# Patient Record
Sex: FEMALE | Race: ASIAN | NOT HISPANIC OR LATINO | Employment: UNEMPLOYED | URBAN - METROPOLITAN AREA
[De-identification: names, ages, dates, MRNs, and addresses within clinical notes are randomized per-mention and may not be internally consistent; named-entity substitution may affect disease eponyms.]

---

## 2021-04-27 ENCOUNTER — OFFICE VISIT - HEALTHEAST (OUTPATIENT)
Dept: MIDWIFE SERVICES | Facility: CLINIC | Age: 31
End: 2021-04-27

## 2021-04-27 DIAGNOSIS — N89.8 VAGINAL IRRITATION: ICD-10-CM

## 2021-04-27 DIAGNOSIS — K59.09 CHRONIC CONSTIPATION: ICD-10-CM

## 2021-04-27 DIAGNOSIS — Z12.4 SCREENING FOR CERVICAL CANCER: ICD-10-CM

## 2021-04-27 DIAGNOSIS — R03.0 ELEVATED BLOOD PRESSURE READING IN OFFICE WITHOUT DIAGNOSIS OF HYPERTENSION: ICD-10-CM

## 2021-04-27 LAB
CLUE CELLS: NORMAL
TRICHOMONAS, WET PREP: NORMAL
YEAST, WET PREP: NORMAL

## 2021-04-27 RX ORDER — AMOXICILLIN 250 MG
1 CAPSULE ORAL 2 TIMES DAILY
Qty: 90 TABLET | Refills: 0 | Status: SHIPPED | OUTPATIENT
Start: 2021-04-27

## 2021-04-27 ASSESSMENT — MIFFLIN-ST. JEOR: SCORE: 1270.61

## 2021-04-28 LAB
HPV SOURCE: NORMAL
HUMAN PAPILLOMA VIRUS 16 DNA: NEGATIVE
HUMAN PAPILLOMA VIRUS 18 DNA: NEGATIVE
HUMAN PAPILLOMA VIRUS FINAL DIAGNOSIS: NORMAL
HUMAN PAPILLOMA VIRUS OTHER HR: NEGATIVE
SPECIMEN DESCRIPTION: NORMAL

## 2021-04-29 LAB
C TRACH DNA SPEC QL PROBE+SIG AMP: NEGATIVE
N GONORRHOEA DNA SPEC QL NAA+PROBE: NEGATIVE

## 2021-05-03 LAB
BKR LAB AP ABNORMAL BLEEDING: NO
BKR LAB AP BIRTH CONTROL/HORMONES: NORMAL
BKR LAB AP CERVICAL APPEARANCE: NORMAL
BKR LAB AP GYN ADEQUACY: NORMAL
BKR LAB AP GYN INTERPRETATION: NORMAL
BKR LAB AP GYN OTHER FINDINGS: NORMAL
BKR LAB AP HPV REFLEX: NORMAL
BKR LAB AP LMP: NORMAL
BKR LAB AP PATIENT STATUS: NORMAL
BKR LAB AP PREVIOUS ABNORMAL: NORMAL
BKR LAB AP PREVIOUS NORMAL: NORMAL
HIGH RISK?: NO
PATH REPORT.COMMENTS IMP SPEC: NORMAL
RESULT FLAG (HE HISTORICAL CONVERSION): NORMAL

## 2021-05-20 ENCOUNTER — RECORDS - HEALTHEAST (OUTPATIENT)
Dept: ADMINISTRATIVE | Facility: OTHER | Age: 31
End: 2021-05-20

## 2021-05-20 ENCOUNTER — OFFICE VISIT - HEALTHEAST (OUTPATIENT)
Dept: INTERNAL MEDICINE | Facility: CLINIC | Age: 31
End: 2021-05-20

## 2021-05-20 DIAGNOSIS — R14.2 FLATULENCE, ERUCTATION AND GAS PAIN: ICD-10-CM

## 2021-05-20 DIAGNOSIS — R14.3 FLATULENCE, ERUCTATION AND GAS PAIN: ICD-10-CM

## 2021-05-20 DIAGNOSIS — K59.09 CHRONIC CONSTIPATION: ICD-10-CM

## 2021-05-20 DIAGNOSIS — R39.9 URINARY SYMPTOM OR SIGN: ICD-10-CM

## 2021-05-20 DIAGNOSIS — R03.0 ELEVATED BLOOD PRESSURE READING WITHOUT DIAGNOSIS OF HYPERTENSION: ICD-10-CM

## 2021-05-20 DIAGNOSIS — R14.1 FLATULENCE, ERUCTATION AND GAS PAIN: ICD-10-CM

## 2021-05-20 LAB
ALBUMIN UR-MCNC: NEGATIVE G/DL
APPEARANCE UR: CLEAR
BILIRUB UR QL STRIP: NEGATIVE
COLOR UR AUTO: YELLOW
GLUCOSE UR STRIP-MCNC: NEGATIVE MG/DL
HGB UR QL STRIP: NEGATIVE
KETONES UR STRIP-MCNC: NEGATIVE MG/DL
LEUKOCYTE ESTERASE UR QL STRIP: NEGATIVE
NITRATE UR QL: NEGATIVE
PH UR STRIP: 6.5 [PH] (ref 5–8)
SP GR UR STRIP: <=1.005 (ref 1–1.03)
UROBILINOGEN UR STRIP-ACNC: NORMAL

## 2021-05-20 RX ORDER — SIMETHICONE 125 MG
125 TABLET,CHEWABLE ORAL EVERY 6 HOURS PRN
Qty: 30 TABLET | Refills: 1 | Status: SHIPPED | OUTPATIENT
Start: 2021-05-20 | End: 2021-10-31

## 2021-05-20 RX ORDER — POLYETHYLENE GLYCOL 3350 17 G/17G
17 POWDER, FOR SOLUTION ORAL 2 TIMES DAILY PRN
Qty: 100 PACKET | Refills: 1 | Status: SHIPPED | OUTPATIENT
Start: 2021-05-20 | End: 2021-09-22

## 2021-05-20 ASSESSMENT — MIFFLIN-ST. JEOR: SCORE: 1244.52

## 2021-05-25 ENCOUNTER — COMMUNICATION - HEALTHEAST (OUTPATIENT)
Dept: INTERNAL MEDICINE | Facility: CLINIC | Age: 31
End: 2021-05-25

## 2021-05-26 ENCOUNTER — TRANSCRIBE ORDERS (OUTPATIENT)
Dept: INTERNAL MEDICINE | Facility: CLINIC | Age: 31
End: 2021-05-26

## 2021-05-26 DIAGNOSIS — R14.3 FLATULENCE, ERUCTATION AND GAS PAIN: ICD-10-CM

## 2021-05-26 DIAGNOSIS — R14.2 FLATULENCE, ERUCTATION AND GAS PAIN: ICD-10-CM

## 2021-05-26 DIAGNOSIS — R14.1 FLATULENCE, ERUCTATION AND GAS PAIN: ICD-10-CM

## 2021-05-26 DIAGNOSIS — K59.09 CHRONIC CONSTIPATION: Primary | ICD-10-CM

## 2021-05-27 VITALS
SYSTOLIC BLOOD PRESSURE: 126 MMHG | OXYGEN SATURATION: 98 % | DIASTOLIC BLOOD PRESSURE: 102 MMHG | SYSTOLIC BLOOD PRESSURE: 130 MMHG | HEART RATE: 72 BPM | DIASTOLIC BLOOD PRESSURE: 88 MMHG

## 2021-05-27 VITALS — HEIGHT: 60 IN | WEIGHT: 138 LBS | BODY MASS INDEX: 27.41 KG/M2

## 2021-06-16 PROBLEM — Z12.4 SCREENING FOR CERVICAL CANCER: Status: ACTIVE | Noted: 2021-04-27

## 2021-06-16 PROBLEM — K59.09 CHRONIC CONSTIPATION: Status: ACTIVE | Noted: 2021-04-27

## 2021-06-17 NOTE — PROGRESS NOTES
Problem visit   A Tammy telephone  was used during the entire visit.     Assessment and Plan:     Chronic constipation  -     methylcellulose (CITRUCEL); Take 2 g by mouth daily.  -     senna-docusate (SENNOSIDES-DOCUSATE SODIUM) 8.6-50 mg tablet; Take 1 tablet by mouth 2 (two) times a day.  -     simethicone (MYLICON) 80 MG chewable tablet; Chew 1 tablet (80 mg total) every 6 (six) hours as needed for flatulence.  -     Ambulatory referral to Internal Medicine    Vaginal irritation  -     Wet Prep, genital  -     Chlamydia trachomatis & Neisseria gonorrhoeae, Amplified Detection    Screening for cervical cancer  -     PAP- Gynecologic Cytology (PAP Smear)    Elevated blood pressure reading in office without diagnosis of hypertension    She does have areas of hyperpigmentation on her lower abdomen but no rash noted. She stated that the burning is more inside of her abdomen and not on the outside. Exam is negative for cystocele, rectocele, uterine prolapse. No pain or tenderness with pelvic exam noted. She does have stool mass in the rectum felt during rectal exam. After discussion agreeable to trial of fiber and laxative with follow up in Primary Care.     Next follow up: 2 weeks in primary care     I discussed the following with the patient:   Adult Healthy Living: Importance of regular exercise  Healthy nutrition  Drinking 6-8 cups of water each day.     SALVADOR Olguin,FLO      Subjective:   Chief Complaint: Cindy Ford is an 30 y.o. female here for a problem visit. She is known to us but has not been her in over three years so is a new patient. She is here with her sister.   She has concerns today about chronic constipation and itching and burning on her on thighs.     started after last baby in 2019 so over a year now. She feels  bloated in the belly and feels like the uterus if falling out. Her hips are sore and burning including the anal area.    For constipation it feels like something is  stuck in there.   Constipation is very bad, lost of pressure to release only small amount of stool. Does not feel like she has hemorrhoids. States she is to uncomfortable to exercise because of her constipation.    She says the rash and itching started after the last pregnancy. She has not used any medications to help. Unsure what makes it worse.   Only using a medication for constipation- may be something to help with gas. Not for constipation.    No intercourse since last baby.   Also wanted me to know that she had her first baby  in [place] and she had some sort of stiches. She had something come from the belly into the vaginal.  Wonders if this is related?  And that she had a loss in 2010 for which she was in seen in the hospital in [place]. The ultrasound did not find any IUP. When she went home she miscarried. She was @12 weeks along.     Unable to obtain records but states she had her last baby in [place] on 5/5/2109. No diabetes, no HTN, no PPH.     Dietary recall- eats two meals per day.   Breakfast- rice and soup. No coffee or tea  Dinner- rice and soup- veggies in soup include chop soy and dried fish  Liquids in the day include- water   When she drank milk she had very upset stomach - only since last birth       Healthy Habits  Are you taking a daily aspirin? No  Do you typically exercising at least 40 min, 3-4 times per week?  NO  Do you usually eat at least 4 servings of fruit and vegetables a day, include whole grains and fiber and avoid regularly eating high fat foods? NO  Have you had an eye exam in the past two years? NO  Do you see a dentist twice per year? NO  Do you have any concerns regarding sleep? No    Safety Screen  If you own firearms, are they secured in a locked gun cabinet or with trigger locks? The patient does not own any firearms  No data recorded    Review of Systems:  Please see above.  The rest of the review of systems are negative for all systems.     History     Reviewed By Date/Time  Sections Reviewed    Adin Rivera, [facility] 4/27/2021  2:01 PM Tobacco        Objective:   Vital Signs:   Visit Vitals  BP (!) 132/94 (Patient Site: Right Arm, Patient Position: Sitting, Cuff Size: Adult Regular)   Pulse 84   Ht 5' (1.524 m)   Wt 142 lb (64.4 kg)   LMP 04/07/2021 (Exact Date)   Breastfeeding No   BMI 27.73 kg/m       PHYSICAL EXAM  General appearance: alert, appears stated age and cooperative  Abdomen: normal findings: soft, non-tender  Pelvic: cervix normal in appearance, external genitalia normal, no adnexal masses or tenderness, no cervical motion tenderness, rectovaginal septum normal, uterus normal size, shape, and consistency and vagina normal without discharge  Skin: hyperpigmentation - abdomen  Neurologic: Grossly normal

## 2021-06-18 NOTE — PATIENT INSTRUCTIONS - HE
Patient Instructions by Dorita Cardenas APRN, CNM at 4/27/2021  1:40 PM     Author: Dorita Cardenas APRN, CNM Service: -- Author Type: Midwife    Filed: 4/27/2021  2:27 PM Encounter Date: 4/27/2021 Status: Signed    : Dorita Cardenas APRN, CNM (Midwife)       Patient Education     Discharge Instructions: Eating a High-Fiber Diet    Your healthcare provider has prescribed a high-fiber diet for you. Fiber is what gives strength and structure to plants. Most grains, beans, vegetables, and fruits contain fiber. Foods rich in fiber are often low in calories and fat, but they fill you up more. These foods may also reduce the risk of certain health problems.  There are two types of fiber:    Insoluble fiber. This is found in whole-grains, cereals, and certain fruits and vegetables (such as apple skins, corn, and beans). Insoluble fiber is made up mainly of plant cell walls. It may prevent constipation and reduce the risk of certain types of cancer.    Soluble fiber. This type of fiber is found in oats, beans, nuts, and certain fruits and vegetables (such as strawberries and peas). Soluble fiber turns to gel in the digestive system, slowing the movement of the digestive tract. It helps control blood sugar levels and can reduce cholesterol, which may help lower the risk of heart disease. Soluble fiber can also help control appetite.   Home care    Know how much fiber you need a day. The recommended daily amount of fiber is 25 grams for women and 38 grams for men. After age 50, daily fiber needs drop to 21 grams for women and 30 grams for men.    Ask your healthcare provider about a fiber supplement. (Always take fiber supplements with a large glass of water.)    Keep track of how much fiber you eat.    Eat a variety of foods high in fiber.    Learn to read and understand food labels.    Ask your healthcare provider how much water you should be drinking.    Look for these high-fiber  foods:  ? Whole-grain breads and cereals    6 ounces a day give you about 18 grams of fiber (1 ounce is equal to 1 slice of bread, 1 cup of dry cereal, or 1/2 cup of cooked rice).    Include wheat and oat bran cereals, whole-wheat muffins or toast, and corn tortillas in your meals.  ? Fruits     2 cups a day give you about 8 grams of fiber.    Apples, oranges, strawberries, pears, and bananas are good sources.    Fruit juice does not contain as much fiber as the fruit it was made from.  ? Vegetables    2  cups a day give you about 11 grams of fiber. Add asparagus, carrots, broccoli, peas, and corn to your meals.  ? Legumes    1/4 cup a day (in place of meat) gives you about 4 grams of fiber. Try navy beans, lentils, chickpeas, and soybeans.  ? Seeds     A small handful of seeds gives you about 3 grams of fiber. Try sunflower seeds.  Follow-up  Make a follow-up appointment, or as advised. Ask your healthcare provider if seeing a registered dietitian may help you plan a high fiber diet.  Date Last Reviewed: 6/1/2017 2000-2019 [employer]. [address] [place] [address], [place] [address]. All rights reserved. This information is not intended as a substitute for professional medical care. Always follow your healthcare professional's instructions.

## 2021-06-18 NOTE — PATIENT INSTRUCTIONS - HE
Patient Instructions by Sara Rome CNP at 5/20/2021  3:20 PM     Author: Sara Rome CNP Service: -- Author Type: Nurse Practitioner    Filed: 5/20/2021  3:50 PM Encounter Date: 5/20/2021 Status: Addendum    : Sara Rome CNP (Nurse Practitioner)    Related Notes: Original Note by Sara Rome CNP (Nurse Practitioner) filed at 5/20/2021  3:42 PM       Stop senna and start Miralax 1-2 times daily.        Patient Education     Excess Gas  Certain foods produce gas when digested. In some people, these foods make an excessive amount of gas. This may cause bloating, burping, or increased gas passing through the rectum (flatulence).     Foods that cause gas  The following foods are more likely to cause this problem. Limit them, or remove them from your diet:    Broccoli    Cauliflower    Spiro sprouts    Cabbage    Cooked dried beans    Fizzy (carbonated) drinks, such as sparkling water, soda, beer, and champagne  Other causes  Other causes of excess gas include:    Eating too fast or talking while you chew. This may cause you to swallow air. This increases the amount of gas in your stomach. And it may make your symptoms worse. Chew each mouthful completely before you swallow. Take your time.    Chewing on gum or sucking on hard candy. These cause you to swallow more often. And some of what you are swallowing is air. This leads to more gas in your stomach. Avoid chewing gum and hard candy.    Overeating. This may increase the feeling of being bloated and cause more gas. When you are full, stop eating.     Being constipated. This can increase the amount of normal intestinal gas. Avoid constipation by getting more fiber in your diet. Good sources of fiber include whole-grain cereal, fresh vegetables (except those in the above list), and fresh fruits. High-fiber foods absorb water and carry it out of the body. When adding more fiber to your diet, you also need to drink  more water. You should drink at least 8, 8-ounce glasses of water (2 quarts) per day.  Date Last Reviewed: 8/1/2016 2000-2017 [employer]. [address] [place] [address], [place] [address]. All rights reserved. This information is not intended as a substitute for professional medical care. Always follow your healthcare professional's instructions.

## 2021-06-21 NOTE — LETTER
Letter by Sara Rome CNP at      Author: Sara Rome CNP Service: -- Author Type: --    Filed:  Encounter Date: 5/25/2021 Status: (Other)         Cindy Ford  [address] [place] [address]             May 25, 2021         Dear Ms. Ford,    Below are the results from your recent visit:    Resulted Orders   XR Abdomen 2 Views    Narrative    EXAM DATE:         05/20/2021    EXAM: X-RAY ABDOMEN, 2 VIEWS  LOCATION: [facility]  DATE/TIME: 5/20/2021 4:15 PM    INDICATION: constipation  COMPARISON: None.    IMPRESSION: Negative abdomen. Bowel gas pattern is normal. Nothing for obstruction or free air. Air-fluid levels are seen within the small bowel and right hemicolon but no intestinal overdistention. No evidence for renal stones.                 Please see results     Please call with questions or contact us using [employer].    Sincerely,        Electronically signed by Sara Rome CNP

## 2021-06-21 NOTE — LETTER
Letter by Sara Rome CNP at      Author: Sara Rome CNP Service: -- Author Type: --    Filed:  Encounter Date: 5/25/2021 Status: (Other)         Cindy Ford  [address] [place] [address]             May 25, 2021         Dear Ms. Logan,    Below are the results from your recent visit:    Resulted Orders   Urinalysis-UC if Indicated   Result Value Ref Range    Color, UA Yellow Colorless, Yellow, Straw, Light Yellow    Clarity, UA Clear Clear    Glucose, UA Negative Negative    Protein, UA Negative Negative    Bilirubin, UA Negative Negative    Urobilinogen, UA 0.2 E.U./dL 0.2 E.U./dL, 1.0 E.U./dL    pH, UA 6.5 5.0 - 8.0    Blood, UA Negative Negative    Ketones, UA Negative Negative    Nitrite, UA Negative Negative    Leukocytes, UA Negative Negative    Specific Gravity, UA <=1.005 1.005 - 1.030    Narrative    Microscopic not indicated  UC not indicated       Please see results     Please call with questions or contact us using [employer].    Sincerely,        Electronically signed by Sara Rome CNP

## 2021-06-24 ENCOUNTER — RECORDS - HEALTHEAST (OUTPATIENT)
Dept: ADMINISTRATIVE | Facility: OTHER | Age: 31
End: 2021-06-24

## 2021-06-24 LAB
ALT SERPL W/O P-5'-P-CCNC: 27 U/L (ref 0–78)
AST SERPL-CCNC: 19 U/L (ref 0–37)
CREAT SERPL-MCNC: 0.64 MG/DL (ref 0.6–1.02)

## 2021-06-30 NOTE — PROGRESS NOTES
Progress Notes by Sara Rome CNP at 2021  3:20 PM     Author: Sara Rome CNP Service: -- Author Type: Nurse Practitioner    Filed: 2021  4:13 PM Encounter Date: 2021 Status: Signed    : Sara Rome CNP (Nurse Practitioner)            [facility]  Patient Name: Cindy Ford  Patient Age: 30 y.o.  YOB: 1990  MRN: [id number]    Date of Visit: 2021  Reason for Office Visit:   Chief Complaint   Patient presents with   ? Follow-up     having pain in rectum after delivery X 2 years ago, having pain in vaginal area. having abdominal pain/pressure           Assessment / Plan / Medical Decision Makin. Chronic constipation  Recommend discontinuation of senna start MiraLAX twice daily high-fiber diet, adequate water and exercise  - polyethylene glycol (MIRALAX) 17 gram packet; Take 1 packet (17 g total) by mouth 2 (two) times a day as needed.  Dispense: 100 packet; Refill: 1  - XR Abdomen 2 Views; Future  - XR Abdomen 2 Views    2. Flatulence, eructation and gas pain  Recommend avoiding gassy food and beverages these are reviewed with patient.  Recommend continuing simethicone every 6 hours as needed  - simethicone (MYLICON) 125 MG chewable tablet; Chew 1 tablet (125 mg total) every 6 (six) hours as needed for flatulence.  Dispense: 30 tablet; Refill: 1    3. Urinary symptom or sign  We will obtain a UA and treat accordingly  - Urinalysis-UC if Indicated    4. Elevated blood pressure reading without diagnosis of hypertension    BP Readings from Last 3 Encounters:   21 126/88   21 (!) 132/94       Patient's blood pressure noted to be elevated in clinic, recommend following a low-sodium diet will have patient follow-up in 1 week repeat blood pressure at that time if remains elevated recommend starting patient on Norvasc 2.5 mg daily.      Orders Placed This Encounter   Procedures   ? XR Abdomen 2 Views   ? Urinalysis-UC  if Indicated   Followup: Return in about 1 week (around 5/27/2021). earlier if needed.            Health Maintenance Review  Health Maintenance   Topic Date Due   ? HEPATITIS C SCREENING  Never done   ? PREVENTIVE CARE VISIT  Never done   ? COVID-19 Vaccine (1) Never done   ? HIV SCREENING  Never done   ? TD 18+ HE  Never done   ? TDAP ADULT ONE TIME DOSE  Never done   ? ADVANCE CARE PLANNING  Never done   ? INFLUENZA VACCINE RULE BASED (Season Ended) 08/01/2021   ? PAP SMEAR  04/27/2026   ? HPV TEST  04/27/2026   ? Pneumococcal Vaccine: Pediatrics (0 to 5 Years) and At-Risk Patients (6 to 64 Years)  Aged Out   ? HEPATITIS B VACCINES  Aged Out         I have discontinued Cindy Ford's simethicone. I have also changed her simethicone. Additionally, I am having her start on polyethylene glycol. Lastly, I am having her maintain her methylcellulose and senna-docusate.      HPI:  Cindy Ford is a 30 y.o. year old who presents to the office today with chronic conditions including chronic constipation.  Patient is new to my practice presents to clinic today with utilization of the [facility] .    Patient was seen by midwives in April 2021 was started on Citrucel 2 g daily and senna twice daily in addition to simethicone as needed for flatulence.  She reported that the constipation was very bad water pressure to release only small amount of stool she noted no hemorrhoids and stated that she was uncomfortable with exercise because of her constipation.    She states she is not constipation, she states when she is lying down, she feels like she lies down she is able to pass gas and feels like she needs to have a bowel movement. She states she is having bowel movements. She states it is soft stools.  She states every time she has a bowel movement it is a little at a time and will go every 1-2 days.        Review of Systems- unremarkable other than listed above and below      Current Scheduled Meds:  Outpatient Encounter  "Medications as of 5/20/2021   Medication Sig Dispense Refill   ? senna-docusate (SENNOSIDES-DOCUSATE SODIUM) 8.6-50 mg tablet Take 1 tablet by mouth 2 (two) times a day. 90 tablet 0   ? methylcellulose (CITRUCEL) Take 2 g by mouth daily. 454 g 0   ? polyethylene glycol (MIRALAX) 17 gram packet Take 1 packet (17 g total) by mouth 2 (two) times a day as needed. 100 packet 1   ? simethicone (MYLICON) 125 MG chewable tablet Chew 1 tablet (125 mg total) every 6 (six) hours as needed for flatulence. 30 tablet 1   ? [DISCONTINUED] simethicone (MYLICON) 125 MG chewable tablet Chew 125 mg every 6 (six) hours as needed for flatulence.     ? [DISCONTINUED] simethicone (MYLICON) 80 MG chewable tablet Chew 1 tablet (80 mg total) every 6 (six) hours as needed for flatulence. 90 tablet 0     No facility-administered encounter medications on file as of 5/20/2021.      No past medical history on file.  No past surgical history on file.  Social History     Tobacco Use   ? Smoking status: Never Smoker   ? Smokeless tobacco: Current User   ? Tobacco comment: Betel nut   Substance Use Topics   ? Alcohol use: No   ? Drug use: No     Family History   Problem Relation Age of Onset   ? Hypertension Mother    ? Stroke Father      Social History     Social History Narrative   ? Not on file       Objective / Physical Examination:  Vitals:    05/20/21 1531 05/20/21 1600   BP: (!) 130/102 126/88   Pulse: 72    SpO2: 98%    Weight: 138 lb (62.6 kg)    Height: 4' 11.5\" (1.511 m)      Wt Readings from Last 3 Encounters:   05/20/21 138 lb (62.6 kg)   04/27/21 142 lb (64.4 kg)     Body mass index is 27.41 kg/m .     General Appearance: Alert and oriented, cooperative, affect appropriate, speech clear, in no apparent distress  Head: Normocephalic, atraumatic  Eyes:Conjunctivae clear and sclerae non-icteric  Neck: Supple, trachea midline. No cervical adenopathy  Lungs:  Normal inspiratory and expiratory effort  Abdomen: Bowel sounds active all four " quadrants. Soft, mild right lower quadrant tenderness close to the pelvis. No hepatomegaly or splenomegaly.   Extremities: Pulses 2+ and equal throughout. No edema.   Integumentary: Warm and dry.   Neuro: Alert and oriented, follows commands appropriately.     No results found.  No results found for this or any previous visit (from the past 240 hour(s)).  Diagnostics:     Results for orders placed or performed in visit on 04/27/21   Wet Prep, genital    Specimen: Genital   Result Value Ref Range    Yeast Result No yeast seen No yeast seen    Trichomonas No Trichomonas seen No Trichomonas seen    Clue Cells, Wet Prep No Clue cells seen No Clue cells seen   Chlamydia trachomatis & Neisseria gonorrhoeae, Amplified Detection    Specimen: Body Fluid   Result Value Ref Range    Chlamydia trachomatis, Amplified Detection Negative Negative    Neisseria gonorrhoeae, Amplified Detection Negative Negative   PAP- Gynecologic Cytology (PAP Smear)   Result Value Ref Range    Case Report       Gynecologic Cytology Report                       Case: [id number]                                   Authorizing Provider:  Dorita Cardenas,    Collected:           04/27/2021 1600                                     FLO FRANCO                                                                     Ordering Location:     [facility]          Received:            04/27/2021 1600                                     [facility]                                                          First Screen:          Milana Kate, CT                                                                               (ASCP)                                                                       Specimen:    SUREPATH PAP, SCREENING, Endocervical/cervical                                             Interpretation  Negative for squamous intraepithelial lesion or malignancy.      Negative for squamous intraepithelial lesion or malignancy    Result  Flag Normal Normal    Other Findings       Shift in vaginal elisha suggestive of bacterial vaginosis    Specimen Adequacy       Satisfactory for evaluation, endocervical/transformation zone component present    HPV Reflex? Yes regardless of result     HIGH RISK No     LMP/Menopause Date 04/07/21     Abnormal Bleeding No     Pt Status not pregnant.     Birth Control/Hormones None     Previous Normal/Date 9/2018     Prev Abn Date/Dx none     Cervical Appearance normal    HPV High Risk DNA Cervical   Result Value Ref Range    HPV Source SurePath     HPV16 DNA Negative NEG    HPV18 DNA Negative NEG    Other HR HPV Negative NEG    Final Diagnosis SEE NOTES     Specimen Description Cervical Cells        Quality review:     PHQ-2 Total Score: 0 (5/20/2021  3:32 PM)  Depression Follow-up Plan: patient follow-up to return when and if necessary (5/20/2021  3:32 PM)      No data recorded      Sara Rome CNP  Internal Medicine  [address] [address]

## 2021-07-01 ENCOUNTER — RECORDS - HEALTHEAST (OUTPATIENT)
Dept: HEALTH INFORMATION MANAGEMENT | Facility: CLINIC | Age: 31
End: 2021-07-01

## 2021-09-22 ENCOUNTER — OFFICE VISIT (OUTPATIENT)
Dept: FAMILY MEDICINE | Facility: CLINIC | Age: 31
End: 2021-09-22
Payer: COMMERCIAL

## 2021-09-22 VITALS
HEART RATE: 76 BPM | DIASTOLIC BLOOD PRESSURE: 80 MMHG | SYSTOLIC BLOOD PRESSURE: 110 MMHG | RESPIRATION RATE: 12 BRPM | HEIGHT: 59 IN | OXYGEN SATURATION: 99 % | WEIGHT: 135.2 LBS | TEMPERATURE: 98 F | BODY MASS INDEX: 27.26 KG/M2

## 2021-09-22 DIAGNOSIS — R10.31 RIGHT GROIN PAIN: Primary | ICD-10-CM

## 2021-09-22 DIAGNOSIS — G89.29 CHRONIC RIGHT-SIDED LOW BACK PAIN WITH RIGHT-SIDED SCIATICA: ICD-10-CM

## 2021-09-22 DIAGNOSIS — K59.09 CHRONIC CONSTIPATION: ICD-10-CM

## 2021-09-22 DIAGNOSIS — M54.41 CHRONIC RIGHT-SIDED LOW BACK PAIN WITH RIGHT-SIDED SCIATICA: ICD-10-CM

## 2021-09-22 PROCEDURE — 99214 OFFICE O/P EST MOD 30 MIN: CPT | Performed by: FAMILY MEDICINE

## 2021-09-22 RX ORDER — POLYETHYLENE GLYCOL 3350 17 G/17G
17 POWDER, FOR SOLUTION ORAL DAILY
Qty: 510 G | Refills: 1 | Status: SHIPPED | OUTPATIENT
Start: 2021-09-22 | End: 2021-10-31

## 2021-09-22 RX ORDER — NAPROXEN 500 MG/1
500 TABLET ORAL 2 TIMES DAILY WITH MEALS
Qty: 60 TABLET | Refills: 4 | Status: SHIPPED | OUTPATIENT
Start: 2021-09-22 | End: 2021-10-31

## 2021-09-22 ASSESSMENT — MIFFLIN-ST. JEOR: SCORE: 1236.01

## 2021-09-22 NOTE — PROGRESS NOTES
OUTPATIENT VISIT NOTE                                                   Date of Visit: 9/22/2021     Chief Complaint   Patient presents with:  BM problems            History of Present Illness   Cindy Ford is a 30 year old female with sister and phone  in for follow up from chiropractor. They state he told her she has a problem with her sacrum  She c/o Right sided Low back pain started about two years ago after she delivered her child.  Pain radiates into her right buttocks.  Has numbness that goes down the front side of right thigh down to the knee.  No weakness of leg.  Pain is intermittent.  She thinks the pain is worse with gassiness.  She also has pain in her right groin area.  She thinks she is missing a tendon because that side feels different than on the left side.    She states she is having trouble with her bowel movements--Doesn't have the urge to have bowel movement-every 2-3 days--just small pieces of stool  This has been going on for about 5 months. Uses a tablet for gas.  Using a powder twice daily.  But she has been out of the powder for at least a month.      She just recently started seeing the chiropractor.  She saw KRYSTA VILLANUEVA in June and they felt she was having constipation and possibly some pelvic floor dysfunction.                 MEDICATIONS   Current Outpatient Medications   Medication     methylcellulose (CITRUCEL) powder     naproxen (NAPROSYN) 500 MG tablet     polyethylene glycol (MIRALAX) 17 GM/Dose powder     senna-docusate (SENNOSIDES-DOCUSATE SODIUM) 8.6-50 mg tablet     simethicone (MYLICON) 125 MG chewable tablet     No current facility-administered medications for this visit.         SOCIAL HISTORY   Social History     Tobacco Use     Smoking status: Never Smoker     Smokeless tobacco: Current User     Tobacco comment: Betel nut   Substance Use Topics     Alcohol use: No           Physical Exam   Vitals:    09/22/21 0949   BP: 110/80   Pulse: 76   Resp: 12   Temp: 98  F (36.7  " C)   TempSrc: Oral   SpO2: 99%   Weight: 61.3 kg (135 lb 3.2 oz)   Height: 1.51 m (4' 11.45\")        GENERAL:   Alert. Oriented.  EYES: Clear  HENT:  Ears: R TM pearly gray. Normal landmarks. L TM pearly gray.  Normal landmarks  Nose: Clear.  Sinuses: Nontender.  Oropharynx:  No erythema. No exudate.  NECK: Supple. No adenopathy.  LUNGS: Clear to ascultation.  No crackles.  No wheezing  HEART: RRR  SKIN:  No rash.   ABDOMEN:  +BS. No tenderness. Ropy feeling LLQ. Soft, no guarding, rebound, rigidity,mass, or organomegaly. No CVA tenderness    BACK:  No pain to percussion over LS spine.  No tenderness to palpation over muscles.  NEURO:  DTR's: Patellar  L 2/4  R 2/4                                Achilles  L  2/4  R 2/4                  Motor:  Great Toe Flexion L 5/5  R 5/5                                                  Extension L 5/5  R 5/5                              Ankle Flexion L 5/5  R 5/5                                        Extension L 5/5  R 5/5                              Knee Flexion  L 5/5  R 5/5                                        Extension  L 5/5  R 5/5                              Hip Abduction  L 5/5  R 5/5                                    Adduction   L 5/5  R 5/5                              Hip Flexion L 5/5  R 5/5                                     Extension L 5/5  R 5/5                  Sensation intact to light touch throughout both LE                   Straight leg raising negative BL      Diagnostic Studies   XRAY:  X-RAY right hip:    Negative. No fracture or dislocation.  Personally reviewed.         Assessment and Plan     Right groin pain  Hip xray appears essentially normal  - XR Hip Right 2-3 Views    Chronic right-sided low back pain with right-sided sciatica    - Spine Referral  - naproxen (NAPROSYN) 500 MG tablet  Dispense: 60 tablet; Refill: 4  Chronic low back for the last couple of years.  appearently has just been in to start having this be evaluated recently.  She " does note some radiation into buttocks and numbness in anterior thigh.  She seems to feel the pain in the groin is also associated with the back pain.  No objective findings on neuro exam of lower extremities.    Will have her start icing frequently.  Naproxen twice dailly for 14 days then as needed.  Referred to spine clinic.       Chronic constipation  I think she is feeling stool in her lower colon.  I'm not sure how the three problems that she is having are related--ie how much of the different symptoms she is having is due to which problem as she tends to relate them all together.  Discussed that she needs to take the fiber and miralax daily for a while to see if it will help with her constipation.    - methylcellulose (CITRUCEL) powder  Dispense: 454 g; Refill: 0  - polyethylene glycol (MIRALAX) 17 GM/Dose powder  Dispense: 510 g; Refill: 1     She has a follow up visit with [facility] next month.  She wants to come to our clinic for her primary care. Discussed that it will be helpful for her to have a primary doctor that can help interpret her test results and exams and coordinate the various sites of care that she is going to.  Discussed that I would like her to follow up with spine clinic rather than the chiropractor.            Recheck in one month.      Discussed signs / symptoms that warrant urgent / emergent medical attention.     Recheck if worsening or not improving.       Saida Yi MD          Pertinent History     The following portions of the patient's history were reviewed and updated as appropriate: allergies, current medications, past family history, past medical history, past social history, past surgical history and problem list.

## 2021-10-06 ENCOUNTER — OFFICE VISIT (OUTPATIENT)
Dept: PHYSICAL MEDICINE AND REHAB | Facility: CLINIC | Age: 31
End: 2021-10-06
Payer: COMMERCIAL

## 2021-10-06 VITALS — HEART RATE: 77 BPM | OXYGEN SATURATION: 100 % | DIASTOLIC BLOOD PRESSURE: 88 MMHG | SYSTOLIC BLOOD PRESSURE: 142 MMHG

## 2021-10-06 DIAGNOSIS — M54.16 RIGHT LUMBAR RADICULITIS: ICD-10-CM

## 2021-10-06 DIAGNOSIS — M54.41 CHRONIC BILATERAL LOW BACK PAIN WITH RIGHT-SIDED SCIATICA: Primary | ICD-10-CM

## 2021-10-06 DIAGNOSIS — G89.29 CHRONIC BILATERAL LOW BACK PAIN WITH RIGHT-SIDED SCIATICA: Primary | ICD-10-CM

## 2021-10-06 PROCEDURE — 99215 OFFICE O/P EST HI 40 MIN: CPT | Performed by: NURSE PRACTITIONER

## 2021-10-06 ASSESSMENT — PAIN SCALES - GENERAL: PAINLEVEL: EXTREME PAIN (9)

## 2021-10-06 NOTE — PROGRESS NOTES
ASSESSMENT: Cindy Ford is a 30 year old female who presents for consultation with a past medical history significant for chronic constipation, who presents today for new patient evaluation of:    -Chronic bilateral low back pain right greater than left with right lumbar radiculitis L4 dermatomal pattern with paresthesias.  Negative hip and negative SI provocative maneuvers on exam.    Patient is neurologically intact on exam. No myelopathic or red flag symptoms.     OSWESTRY DISABILITY INDEX 10/6/2021   Count 10   Sum 23   Oswestry Score (%) 46            Diagnoses and all orders for this visit:  Chronic bilateral low back pain with right-sided sciatica  -     MR Lumbar Spine w/o Contrast; Future  -     Physical Therapy Referral; Future  Right lumbar radiculitis  -     MR Lumbar Spine w/o Contrast; Future  -     Physical Therapy Referral; Future    PLAN:  Reviewed spine anatomy and disease process. Discussed diagnosis and treatment options with the patient today. A shared decision making model was used.  The patient's values and choices were respected. The following represents what was discussed and decided upon by the provider and the patient.      -DIAGNOSTIC TESTS:  Images were personally reviewed and interpreted and explained to patient today using spine model.   --Ordered lumbar spine MRI to further evaluate lumbar radiculitis, no benefit with prior conservative treatment.  --Right hip x-ray 9/22/2021 with normal joint space and alignment.  Right SI joint unremarkable.    -PHYSICAL THERAPY: Referral to physical therapy specifically for core strengthening pelvic floor exercises and nerve glide exercises.  Discussed the importance of core strengthening, ROM, stretching exercises with the patient and how each of these entities is important in decreasing pain.  Explained to the patient that the purpose of physical therapy is to teach the patient a home exercise program.  These exercises need to be performed every day  in order to decrease pain and prevent future occurrences of pain.        -MEDICATIONS: Consider medications such as gabapentin down the road.  Discussed multiple medication options today with patient. Discussed risks, side effects, and proper use of medications. Patient verbalized understanding.    -INTERVENTIONS: Consider injections pending MRI imaging.  Discussed risks and benefits of injections with patient today.    -PATIENT EDUCATION:  Total time of 46 minutes spent with the patient, reviewing the chart, placing orders, and documenting.   -Today we also discussed the issues related to the current COVID-19 pandemic, the pros and cons of the current treatment plan, the CDC guidelines such as social distancing, washing hands, masking, and covering the cough.    -FOLLOW-UP:   Follow-up after obtaining MRI to review results and ongoing plan.    Advised patient to call the Spine Center if symptoms worsen or you have problems controlling bladder and bowel function.   ______________________________________________________________________    SUBJECTIVE:  HPI:  Cindy Ford  Is a 30 year old female who presents today for new patient evaluation of low back pain bilateral low back pain lower lumbar spine in the belt line with radiation of pain on the right buttock lateral hip anterior groin and anterior thigh that stops at the knee with associated numbness and tingling also ongoing for the last 2 years, no relief with chiropractic treatments and exercises given to her by the chiropractor.  Her pain is fairly significant at a constant 9/10.  Aggravated with prolonged sitting as well as prolonged laying down.  Tolerable with walking however the pain is constant.  Patient denies lower extremity weakness, denies recent trips or falls or balance changes, denies bowel or bladder loss control.    Patient's sister who does speak fluent English was present today during entire visit and added to past medical/surgical/family/social  history and history of presenting illness.   Tammy  was present during entire visit today.     -Treatment to Date: No prior spinal surgery or spinal injection  Chiropractic treatments September 2021 with no benefit    -Medications:  Naproxen 500 mg with minimal benefit    Current Outpatient Medications   Medication     naproxen (NAPROSYN) 500 MG tablet     methylcellulose (CITRUCEL) powder     polyethylene glycol (MIRALAX) 17 GM/Dose powder     senna-docusate (SENNOSIDES-DOCUSATE SODIUM) 8.6-50 mg tablet     simethicone (MYLICON) 125 MG chewable tablet     No current facility-administered medications for this visit.       No Known Allergies    No past medical history on file.     Patient Active Problem List   Diagnosis     Implantable Contraceptive Capsules - Insertion     Chronic constipation     Screening for cervical cancer       No past surgical history on file.    Family History   Problem Relation Age of Onset     Hypertension Mother      Cerebrovascular Disease Father        Reviewed past medical, surgical, and family history with patient found on new patient intake packet located in EMR [employer] tab.     SOCIAL HX: Patient is .  Patient does report chewing tobacco.  Patient denies smoking/tobacco use, denies alcohol use, denies history being heavy drinker.    ROS: Positive for joint pain, muscle pain, dizziness, headache.  Specifically negative for bowel/bladder dysfunction, balance changes, foot drop, fevers, chills, appetite changes, nausea/vomiting, unexplained weight loss. Otherwise 13 systems reviewed are negative. Please see the patient's intake questionnaire from today for details.    OBJECTIVE:  BP (!) 142/88 (BP Location: Left arm, Patient Position: Sitting)   Pulse 77   SpO2 100%     PHYSICAL EXAMINATION:    --CONSTITUTIONAL:  Vital signs as above.  No acute distress.  The patient is well nourished and well groomed.  --PSYCHIATRIC:  Appropriate mood and affect. The patient is awake,  alert, oriented to person, place, time and answering questions appropriately with clear speech.    --SKIN:  Skin over the face, bilateral lower extremities, and posterior torso is clean, dry, intact without rashes.    --RESPIRATORY: Normal rhythm and effort. No abnormal accessory muscle breathing patterns noted.   --ABDOMINAL:  Non-distended.  --STANDING EXAMINATION:  Normal lumbar lordosis noted, no lateral shift.  --MUSCULOSKELETAL: Lumbar spine inspection reveals no evidence of deformity. Range of motion is not limited in lumbar flexion, extension, lateral rotation. No tenderness to palpation lumbar spine. Straight leg raising in the supine position is negative to radicular pain, increased back pain on the right. Sciatic notch non-tender.  --SACROILIAC JOINT: Negative distraction.  Negative Oksana's with reproduction of pain to affected extremity.  Negative Gaenslen's Test with reproduction of pain to affected extremity. One Finger point test negative.  --GROSS MOTOR: Gait is non-antalgic. Easily arises from a seated position.   --LOWER EXTREMITY MOTOR TESTING:  Plantar flexion left 5/5, right 5/5   Dorsiflexion left 5/5, right 5/5   Great toe MTP extension left 5/5, right 5/5  Knee flexion left 5/5, right 5/5  Knee extension left 5/5, right 5/5   Hip flexion left 5/5, right 5/5  Hip abduction left 5/5, right 5/5  Hip adduction left 5/5, right 5/5   --HIPS: Full range of motion bilaterally. Negative FABERs on the involved lower extremity.  Negative scour maneuver.  --NEUROLOGICAL:  2/4 patellar, medial hamstring, and achilles reflexes bilaterally.  Sensation to light touch is intact in the bilateral L4, L5, and S1 dermatomes. Babinski is negative. No clonus.  Negative Navin reflex bilaterally.  --VASCULAR:  2/4 dorsalis pedis and posterior tibialsi pulses bilaterally.  Bilateral lower extremities are warm.  There is no pitting edema of the bilateral lower extremities.    RESULTS: Prior medical records from [facility] and Care Everywhere were reviewed today.    Imaging: Lumbar spine Imaging was personally reviewed and interpreted today. The images were shown to the patient and the findings were explained using a spine model.      XR Hip Right 2-3 Views  Result Date: 9/22/2021  EXAM: XR HIP RIGHT 2-3 VIEWS LOCATION: [facility] DATE/TIME: 9/22/2021 10:19 AM INDICATION:  Right groin pain COMPARISON: None.   IMPRESSION: Normal joint spaces and alignment. No fracture. No degenerative changes.

## 2021-10-06 NOTE — LETTER
10/6/2021         RE: Cindy Ford  [address] [place] [address]        Dear Colleague,    Thank you for referring your patient, Cindy Ford, to the [facility] [place]. Please see a copy of my visit note below.    ASSESSMENT: Cindy Ford is a 30 year old female who presents for consultation with a past medical history significant for chronic constipation, who presents today for new patient evaluation of:    -Chronic bilateral low back pain right greater than left with right lumbar radiculitis L4 dermatomal pattern with paresthesias.  Negative hip and negative SI provocative maneuvers on exam.    Patient is neurologically intact on exam. No myelopathic or red flag symptoms.     OSWESTRY DISABILITY INDEX 10/6/2021   Count 10   Sum 23   Oswestry Score (%) 46            Diagnoses and all orders for this visit:  Chronic bilateral low back pain with right-sided sciatica  -     MR Lumbar Spine w/o Contrast; Future  -     Physical Therapy Referral; Future  Right lumbar radiculitis  -     MR Lumbar Spine w/o Contrast; Future  -     Physical Therapy Referral; Future    PLAN:  Reviewed spine anatomy and disease process. Discussed diagnosis and treatment options with the patient today. A shared decision making model was used.  The patient's values and choices were respected. The following represents what was discussed and decided upon by the provider and the patient.      -DIAGNOSTIC TESTS:  Images were personally reviewed and interpreted and explained to patient today using spine model.   --Ordered lumbar spine MRI to further evaluate lumbar radiculitis, no benefit with prior conservative treatment.  --Right hip x-ray 9/22/2021 with normal joint space and alignment.  Right SI joint unremarkable.    -PHYSICAL THERAPY: Referral to physical therapy specifically for core strengthening pelvic floor exercises and nerve glide exercises.  Discussed the importance of core strengthening, ROM, stretching  exercises with the patient and how each of these entities is important in decreasing pain.  Explained to the patient that the purpose of physical therapy is to teach the patient a home exercise program.  These exercises need to be performed every day in order to decrease pain and prevent future occurrences of pain.        -MEDICATIONS: Consider medications such as gabapentin down the road.  Discussed multiple medication options today with patient. Discussed risks, side effects, and proper use of medications. Patient verbalized understanding.    -INTERVENTIONS: Consider injections pending MRI imaging.  Discussed risks and benefits of injections with patient today.    -PATIENT EDUCATION:  Total time of 46 minutes spent with the patient, reviewing the chart, placing orders, and documenting.   -Today we also discussed the issues related to the current COVID-19 pandemic, the pros and cons of the current treatment plan, the CDC guidelines such as social distancing, washing hands, masking, and covering the cough.    -FOLLOW-UP:   Follow-up after obtaining MRI to review results and ongoing plan.    Advised patient to call the Spine Center if symptoms worsen or you have problems controlling bladder and bowel function.   ______________________________________________________________________    SUBJECTIVE:  HPI:  Cindy Ford  Is a 30 year old female who presents today for new patient evaluation of low back pain bilateral low back pain lower lumbar spine in the belt line with radiation of pain on the right buttock lateral hip anterior groin and anterior thigh that stops at the knee with associated numbness and tingling also ongoing for the last 2 years, no relief with chiropractic treatments and exercises given to her by the chiropractor.  Her pain is fairly significant at a constant 9/10.  Aggravated with prolonged sitting as well as prolonged laying down.  Tolerable with walking however the pain is constant.  Patient denies  lower extremity weakness, denies recent trips or falls or balance changes, denies bowel or bladder loss control.    Patient's sister who does speak fluent English was present today during entire visit and added to past medical/surgical/family/social history and history of presenting illness.   Tammy  was present during entire visit today.     -Treatment to Date: No prior spinal surgery or spinal injection  Chiropractic treatments September 2021 with no benefit    -Medications:  Naproxen 500 mg with minimal benefit    Current Outpatient Medications   Medication     naproxen (NAPROSYN) 500 MG tablet     methylcellulose (CITRUCEL) powder     polyethylene glycol (MIRALAX) 17 GM/Dose powder     senna-docusate (SENNOSIDES-DOCUSATE SODIUM) 8.6-50 mg tablet     simethicone (MYLICON) 125 MG chewable tablet     No current facility-administered medications for this visit.       No Known Allergies    No past medical history on file.     Patient Active Problem List   Diagnosis     Implantable Contraceptive Capsules - Insertion     Chronic constipation     Screening for cervical cancer       No past surgical history on file.    Family History   Problem Relation Age of Onset     Hypertension Mother      Cerebrovascular Disease Father        Reviewed past medical, surgical, and family history with patient found on new patient intake packet located in EMR [employer] tab.     SOCIAL HX: Patient is .  Patient does report chewing tobacco.  Patient denies smoking/tobacco use, denies alcohol use, denies history being heavy drinker.    ROS: Positive for joint pain, muscle pain, dizziness, headache.  Specifically negative for bowel/bladder dysfunction, balance changes, foot drop, fevers, chills, appetite changes, nausea/vomiting, unexplained weight loss. Otherwise 13 systems reviewed are negative. Please see the patient's intake questionnaire from today for details.    OBJECTIVE:  BP (!) 142/88 (BP Location: Left arm, Patient  Position: Sitting)   Pulse 77   SpO2 100%     PHYSICAL EXAMINATION:    --CONSTITUTIONAL:  Vital signs as above.  No acute distress.  The patient is well nourished and well groomed.  --PSYCHIATRIC:  Appropriate mood and affect. The patient is awake, alert, oriented to person, place, time and answering questions appropriately with clear speech.    --SKIN:  Skin over the face, bilateral lower extremities, and posterior torso is clean, dry, intact without rashes.    --RESPIRATORY: Normal rhythm and effort. No abnormal accessory muscle breathing patterns noted.   --ABDOMINAL:  Non-distended.  --STANDING EXAMINATION:  Normal lumbar lordosis noted, no lateral shift.  --MUSCULOSKELETAL: Lumbar spine inspection reveals no evidence of deformity. Range of motion is not limited in lumbar flexion, extension, lateral rotation. No tenderness to palpation lumbar spine. Straight leg raising in the supine position is negative to radicular pain, increased back pain on the right. Sciatic notch non-tender.  --SACROILIAC JOINT: Negative distraction.  Negative Oksana's with reproduction of pain to affected extremity.  Negative Gaenslen's Test with reproduction of pain to affected extremity. One Finger point test negative.  --GROSS MOTOR: Gait is non-antalgic. Easily arises from a seated position.   --LOWER EXTREMITY MOTOR TESTING:  Plantar flexion left 5/5, right 5/5   Dorsiflexion left 5/5, right 5/5   Great toe MTP extension left 5/5, right 5/5  Knee flexion left 5/5, right 5/5  Knee extension left 5/5, right 5/5   Hip flexion left 5/5, right 5/5  Hip abduction left 5/5, right 5/5  Hip adduction left 5/5, right 5/5   --HIPS: Full range of motion bilaterally. Negative FABERs on the involved lower extremity.  Negative scour maneuver.  --NEUROLOGICAL:  2/4 patellar, medial hamstring, and achilles reflexes bilaterally.  Sensation to light touch is intact in the bilateral L4, L5, and S1 dermatomes. Babinski is negative. No clonus.  Negative  Navin reflex bilaterally.  --VASCULAR:  2/4 dorsalis pedis and posterior tibialsi pulses bilaterally.  Bilateral lower extremities are warm.  There is no pitting edema of the bilateral lower extremities.    RESULTS: Prior medical records from [facility] and Care Everywhere were reviewed today.    Imaging: Lumbar spine Imaging was personally reviewed and interpreted today. The images were shown to the patient and the findings were explained using a spine model.      XR Hip Right 2-3 Views  Result Date: 9/22/2021  EXAM: XR HIP RIGHT 2-3 VIEWS LOCATION: [facility] DATE/TIME: 9/22/2021 10:19 AM INDICATION:  Right groin pain COMPARISON: None.   IMPRESSION: Normal joint spaces and alignment. No fracture. No degenerative changes.               Again, thank you for allowing me to participate in the care of your patient.        Sincerely,        Liberty Bardley, CNP

## 2021-10-06 NOTE — PATIENT INSTRUCTIONS
~Please call our [facility] Nurse Navigation line [phone] with any questions or concerns about your treatment plan, if symptoms worsen and you would like to be seen urgently, or if you have problems controlling bladder and bowel function.      ~You have been referred for Physical Therapy to [facility]. They will call you to schedule an appointment.      Scheduling phone number is [phone] for [facility], or [place] location.  If you have not heard from the scheduling office within 2 business days, please call [phone] for ALL other locations.    Discussed the importance of core strengthening, ROM, stretching exercises and how each of these entities is important in decreasing pain and improving long term spine health.  The purpose of physical therapy is to teach you an individualized home exercise program.  These exercises need to be performed every day in order to decrease pain and prevent future occurrences of pain.          Imaging has been ordered. Radiology will call you to schedule. Please call below if you do not hear from them in the next couple of days.   [facility] Radiology Scheduling  Please call [phone] to schedule your image(s) (select option #1). There are 2 different locations, see below.     [facility]  [address] [id number]    [place] [id number] [facility]] [id number]

## 2021-10-17 ENCOUNTER — HOSPITAL ENCOUNTER (OUTPATIENT)
Dept: MRI IMAGING | Facility: CLINIC | Age: 31
Discharge: HOME | End: 2021-10-17
Attending: NURSE PRACTITIONER | Admitting: NURSE PRACTITIONER
Payer: COMMERCIAL

## 2021-10-17 DIAGNOSIS — M54.41 CHRONIC BILATERAL LOW BACK PAIN WITH RIGHT-SIDED SCIATICA: ICD-10-CM

## 2021-10-17 DIAGNOSIS — M54.16 RIGHT LUMBAR RADICULITIS: ICD-10-CM

## 2021-10-17 DIAGNOSIS — G89.29 CHRONIC BILATERAL LOW BACK PAIN WITH RIGHT-SIDED SCIATICA: ICD-10-CM

## 2021-10-17 PROCEDURE — 72148 MRI LUMBAR SPINE W/O DYE: CPT

## 2021-10-21 ENCOUNTER — TELEPHONE (OUTPATIENT)
Dept: PHYSICAL MEDICINE AND REHAB | Facility: CLINIC | Age: 31
End: 2021-10-21

## 2021-10-21 DIAGNOSIS — M48.061 SPINAL STENOSIS OF LUMBAR REGION WITHOUT NEUROGENIC CLAUDICATION: Primary | ICD-10-CM

## 2021-10-21 DIAGNOSIS — M54.41 CHRONIC BILATERAL LOW BACK PAIN WITH RIGHT-SIDED SCIATICA: ICD-10-CM

## 2021-10-21 DIAGNOSIS — M54.16 RIGHT LUMBAR RADICULITIS: ICD-10-CM

## 2021-10-21 DIAGNOSIS — M51.26 LUMBAR DISC HERNIATION: ICD-10-CM

## 2021-10-21 DIAGNOSIS — G89.29 CHRONIC BILATERAL LOW BACK PAIN WITH RIGHT-SIDED SCIATICA: ICD-10-CM

## 2021-10-21 NOTE — TELEPHONE ENCOUNTER
----- Message from Liberty Bradley CNP sent at 10/21/2021  8:01 AM CDT -----  Please call patient and notify her that I did review her MRI.  I know she is scheduled for a follow-up next week.  However I did want and let her know that there is a disc herniation causing significant nerve compression at the upper lumbar spine L1-2.  It would be reasonable to get a surgical opinion at this time.  Otherwise we can discuss in clinic if she prefers.  Thanks,  Liberty

## 2021-10-21 NOTE — TELEPHONE ENCOUNTER
Call placed to patient with provider's results and recommendations utilizing assistance of [employer] . Left message to return call.    Pt is scheduled for follow-up appt with Liberty on Monday 10/25.

## 2021-10-21 NOTE — TELEPHONE ENCOUNTER
Pt returned call with her sister on the line as well who speaks english. Results and recommendations given. They stated understanding. Pt ok with surgical referral being placed and she will plan to follow-up with Liberty on Monday as well to review imaging.

## 2021-10-25 ENCOUNTER — OFFICE VISIT (OUTPATIENT)
Dept: PHYSICAL MEDICINE AND REHAB | Facility: CLINIC | Age: 31
End: 2021-10-25
Payer: COMMERCIAL

## 2021-10-25 VITALS — DIASTOLIC BLOOD PRESSURE: 72 MMHG | SYSTOLIC BLOOD PRESSURE: 126 MMHG

## 2021-10-25 DIAGNOSIS — M48.061 SPINAL STENOSIS OF LUMBAR REGION WITHOUT NEUROGENIC CLAUDICATION: ICD-10-CM

## 2021-10-25 DIAGNOSIS — M54.41 CHRONIC BILATERAL LOW BACK PAIN WITH RIGHT-SIDED SCIATICA: Primary | ICD-10-CM

## 2021-10-25 DIAGNOSIS — M54.16 RIGHT LUMBAR RADICULITIS: ICD-10-CM

## 2021-10-25 DIAGNOSIS — M51.26 LUMBAR DISC HERNIATION: ICD-10-CM

## 2021-10-25 DIAGNOSIS — G89.29 CHRONIC BILATERAL LOW BACK PAIN WITH RIGHT-SIDED SCIATICA: Primary | ICD-10-CM

## 2021-10-25 PROCEDURE — 99214 OFFICE O/P EST MOD 30 MIN: CPT | Performed by: NURSE PRACTITIONER

## 2021-10-25 RX ORDER — GABAPENTIN 300 MG/1
300 CAPSULE ORAL AT BEDTIME
Qty: 90 CAPSULE | Refills: 1 | Status: SHIPPED | OUTPATIENT
Start: 2021-10-25 | End: 2021-10-31

## 2021-10-25 ASSESSMENT — PAIN SCALES - GENERAL: PAINLEVEL: EXTREME PAIN (8)

## 2021-10-25 NOTE — PROGRESS NOTES
Assessment:     Diagnoses and all orders for this visit:  Chronic bilateral low back pain with right-sided sciatica  Right lumbar radiculitis  -     acetaminophen-codeine (TYLENOL #3) 300-30 MG tablet; Take 1 tablet by mouth every 8 hours as needed for severe pain  -     gabapentin (NEURONTIN) 300 MG capsule; Take 1 capsule (300 mg) by mouth At Bedtime Follow Dosing Chart  Lumbar disc herniation  Spinal stenosis of lumbar region without neurogenic claudication  -     acetaminophen-codeine (TYLENOL #3) 300-30 MG tablet; Take 1 tablet by mouth every 8 hours as needed for severe pain  -     gabapentin (NEURONTIN) 300 MG capsule; Take 1 capsule (300 mg) by mouth At Bedtime Follow Dosing Chart     Cindy Ford is a 30 year old y.o. female with past medical history significant for constipation who presents today for follow-up regarding:    -Chronic bilateral low back pain right greater than left with lumbar radiculitis with paresthesias.     Plan:     A shared decision making plan was used. The patient's values and choices were respected. Prior medical records were reviewed today. The following represents what was discussed and decided upon by the provider and the patient.        -DIAGNOSTIC TESTS: Images were personally reviewed and interpreted.   --Lumbar spine MRI 10/17/2021 with L1-2 degenerative disc changes with disc extrusion with severe spinal stenosis.  No significant foraminal stenosis.  --Right hip x-ray 9/22/2021 with normal joint space and alignment.  Right SI joint unremarkable.    -Neurosurgical referral placed 10/21/2021 for surgical opinion for disc extrusion at L1-2 causing severe spinal stenosis.    -INTERVENTIONS: No recommendations for spine injections at this time.    -MEDICATIONS: Prescribe gabapentin 300 mg at bedtime to help with radicular pain.  Also prescribe Tylenol 3 with codeine 1 tablet 3 times daily as needed for severe breakthrough pain number 21 tablets given for 7 days worth.  Discussed  side effects of medications and proper use. Patient verbalized understanding.    -PHYSICAL THERAPY: Physical therapy referral placed 10/6/2021.  Advised patient to hold on physical therapy at this time due to the severe spinal stenosis.  Discussed the importance of core strengthening, ROM, stretching exercises with the patient and how each of these entities is important in decreasing pain.  Explained to the patient that the purpose of physical therapy is to teach the patient a home exercise program.  These exercises need to be performed every day in order to decrease pain and prevent future occurrences of pain.        -PATIENT EDUCATION:  Total time of 32 minutes, on the day of service, spent with the patient, reviewing the chart, placing orders, and documenting.   -Today we also discussed the issues related to the current COVID-19 pandemic, the pros and cons of the current treatment plan, the CDC guidelines such as social distancing, washing the hands, and covering the cough.    -FOLLOW UP: Follow-up for neurosurgery consult.  Advised to contact clinic if symptoms worsen or change.    Subjective:     Cindy Ford is a 30 year old female who presents today for follow-up regarding bilateral low back pain that radiates into the buttock region right greater than left with generalized lower extremity pain that is greater on the right as well with pain into the groin but also down into the foot bilaterally.  Currently her pain is fairly significant and 8/10, in general worse with activity sitting, laying down, walking, lifting, nothing is currently giving her benefit.  She does feel slight perceived weakness in her lower extremity, denies any recent trips or falls or balance changes.  Patient denies bowel or bladder loss of control but she does feel like she has a harder time pushing to have a bowel movement.  No reports of constipation she feels her stools are soft and formed.    -Treatment to Date: No prior spinal surgery  or spinal injection  Chiropractic treatments September 2021 with no benefit     -Medications:  Naproxen 500 mg with minimal benefit    Patient Active Problem List   Diagnosis     Implantable Contraceptive Capsules - Insertion     Chronic constipation     Screening for cervical cancer       Current Outpatient Medications   Medication     acetaminophen-codeine (TYLENOL #3) 300-30 MG tablet     gabapentin (NEURONTIN) 300 MG capsule     methylcellulose (CITRUCEL) powder     naproxen (NAPROSYN) 500 MG tablet     polyethylene glycol (MIRALAX) 17 GM/Dose powder     senna-docusate (SENNOSIDES-DOCUSATE SODIUM) 8.6-50 mg tablet     simethicone (MYLICON) 125 MG chewable tablet     No current facility-administered medications for this visit.       No Known Allergies    No past medical history on file.     Review of Systems  ROS:  Specifically negative for bowel/bladder dysfunction, balance changes, headache, dizziness, foot drop, fevers, chills, appetite changes, nausea/vomiting, unexplained weight loss. Otherwise 13 systems reviewed are negative. Please see the patient's intake questionnaire from today for details.    Reviewed Social, Family, Past Medical and Past Surgical history with patient, no significant changes noted since prior visit.     Objective:     /72 (BP Location: Right arm, Patient Position: Sitting)     PHYSICAL EXAMINATION:    --CONSTITUTIONAL: Well developed, well nourished, healthy appearing individual.  --PSYCHIATRIC: Appropriate mood and affect. No difficulty interacting due to temper, social withdrawal, or memory issues.  --SKIN: Lumbar region is dry and intact.   --RESPIRATORY: Normal rhythm and effort. No abnormal accessory muscle breathing patterns noted.   --MUSCULOSKELETAL:  Normal lumbar lordosis noted, no lateral shift.  --GROSS MOTOR: Easily arises from a seated position. Gait is non-antalgic  --LUMBAR SPINE:  Inspection reveals no evidence of deformity. metric. Sensation to light touch is  intact in the bilateral L4, L5, and S1 dermatomes.    RESULTS:   Imaging: Lumbar spine imaging was reviewed today. The images were shown to the patient and the findings were explained using a spine model.    MR Lumbar Spine w/o Contrast  Result Date: 10/21/2021  EXAM: MR LUMBAR SPINE W/O CONTRAST LOCATION: [facility] DATE/TIME: 10/17/2021 3:09 PM INDICATION: Low back pain, > 6 wks COMPARISON: None. TECHNIQUE: Routine Lumbar Spine MRI without IV contrast. FINDINGS: Nomenclature is based on five lumbar-type vertebral bodies. Vertebral body heights are unremarkable. There is no evidence of a marrow-replacing process. No pathologic bony edema is demonstrated. The visualized proximal femora, sacrum and elodia appear intact. Alignment is unremarkable. The conus terminates at L2-L3. No signal abnormalities of the visualized cord or cauda equina nerve roots are demonstrated. The paraspinal soft tissues are unremarkable. Limited images of the abdominal cavity demonstrate no acute abnormality. There is no evidence of abdominal aortic aneurysm. T12-L1: Normal disc height and signal. No herniation. Normal facets. No spinal canal or neural foraminal stenosis. L1-L2: Moderate intervertebral disc height loss with disc desiccation. Shallow bulge and irregular broad-based central extrusion with predominantly superiorly directed components. No significant facet arthropathy. Severe spinal canal stenosis. No significant neural foraminal stenosis. L2-L3: Normal disc height and signal. No herniation. Normal facets. No spinal canal or neural foraminal stenosis. L3-L4: Normal disc height and signal. No herniation. Normal facets. No spinal canal or neural foraminal stenosis. L4-L5: Unremarkable disc height and signal. No herniation. Mild facet arthropathy. No spinal canal stenosis or neural foraminal stenosis. L5-S1: Normal disc height and signal. No herniation. Mild facet arthropathy. No spinal canal or neural  foraminal stenosis.   IMPRESSION: 1.  At L1-L2, a central disc extrusion contributes to severe spinal canal stenosis. 2.  Additional findings as above without high-grade neural foraminal stenosis or other sites of high-grade spinal canal stenosis       XR Hip Right 2-3 Views  Result Date: 9/22/2021  EXAM: XR HIP RIGHT 2-3 VIEWS LOCATION: [facility] DATE/TIME: 9/22/2021 10:19 AM INDICATION:  Right groin pain COMPARISON: None.   IMPRESSION: Normal joint spaces and alignment. No fracture. No degenerative changes.

## 2021-10-25 NOTE — PATIENT INSTRUCTIONS
~Please call our [facility] Nurse Navigation line [phone] with any questions or concerns about your treatment plan, if symptoms worsen and you would like to be seen urgently, or if you have problems controlling bladder and bowel function.      You have been referred to [facility] Neurosurgery for surgical consult.  They will callyou to schedule an appointment at the [facility].    Neurosurgery Scheduling phone #: [phone]

## 2021-10-25 NOTE — LETTER
10/25/2021         RE: Cindy Ford  [address] [place] [address]        Dear Colleague,    Thank you for referring your patient, Cindy Ford, to the [facility] [place]. Please see a copy of my visit note below.      Assessment:     Diagnoses and all orders for this visit:  Chronic bilateral low back pain with right-sided sciatica  Right lumbar radiculitis  -     acetaminophen-codeine (TYLENOL #3) 300-30 MG tablet; Take 1 tablet by mouth every 8 hours as needed for severe pain  -     gabapentin (NEURONTIN) 300 MG capsule; Take 1 capsule (300 mg) by mouth At Bedtime Follow Dosing Chart  Lumbar disc herniation  Spinal stenosis of lumbar region without neurogenic claudication  -     acetaminophen-codeine (TYLENOL #3) 300-30 MG tablet; Take 1 tablet by mouth every 8 hours as needed for severe pain  -     gabapentin (NEURONTIN) 300 MG capsule; Take 1 capsule (300 mg) by mouth At Bedtime Follow Dosing Chart     Cindy Ford is a 30 year old y.o. female with past medical history significant for constipation who presents today for follow-up regarding:    -Chronic bilateral low back pain right greater than left with lumbar radiculitis with paresthesias.     Plan:     A shared decision making plan was used. The patient's values and choices were respected. Prior medical records were reviewed today. The following represents what was discussed and decided upon by the provider and the patient.        -DIAGNOSTIC TESTS: Images were personally reviewed and interpreted.   --Lumbar spine MRI 10/17/2021 with L1-2 degenerative disc changes with disc extrusion with severe spinal stenosis.  No significant foraminal stenosis.  --Right hip x-ray 9/22/2021 with normal joint space and alignment.  Right SI joint unremarkable.    -Neurosurgical referral placed 10/21/2021 for surgical opinion for disc extrusion at L1-2 causing severe spinal stenosis.    -INTERVENTIONS: No recommendations for spine injections at  this time.    -MEDICATIONS: Prescribe gabapentin 300 mg at bedtime to help with radicular pain.  Also prescribe Tylenol 3 with codeine 1 tablet 3 times daily as needed for severe breakthrough pain number 21 tablets given for 7 days worth.  Discussed side effects of medications and proper use. Patient verbalized understanding.    -PHYSICAL THERAPY: Physical therapy referral placed 10/6/2021.  Advised patient to hold on physical therapy at this time due to the severe spinal stenosis.  Discussed the importance of core strengthening, ROM, stretching exercises with the patient and how each of these entities is important in decreasing pain.  Explained to the patient that the purpose of physical therapy is to teach the patient a home exercise program.  These exercises need to be performed every day in order to decrease pain and prevent future occurrences of pain.        -PATIENT EDUCATION:  Total time of 32 minutes, on the day of service, spent with the patient, reviewing the chart, placing orders, and documenting.   -Today we also discussed the issues related to the current COVID-19 pandemic, the pros and cons of the current treatment plan, the CDC guidelines such as social distancing, washing the hands, and covering the cough.    -FOLLOW UP: Follow-up for neurosurgery consult.  Advised to contact clinic if symptoms worsen or change.    Subjective:     Cindy Ford is a 30 year old female who presents today for follow-up regarding bilateral low back pain that radiates into the buttock region right greater than left with generalized lower extremity pain that is greater on the right as well with pain into the groin but also down into the foot bilaterally.  Currently her pain is fairly significant and 8/10, in general worse with activity sitting, laying down, walking, lifting, nothing is currently giving her benefit.  She does feel slight perceived weakness in her lower extremity, denies any recent trips or falls or balance  changes.  Patient denies bowel or bladder loss of control but she does feel like she has a harder time pushing to have a bowel movement.  No reports of constipation she feels her stools are soft and formed.    -Treatment to Date: No prior spinal surgery or spinal injection  Chiropractic treatments September 2021 with no benefit     -Medications:  Naproxen 500 mg with minimal benefit    Patient Active Problem List   Diagnosis     Implantable Contraceptive Capsules - Insertion     Chronic constipation     Screening for cervical cancer       Current Outpatient Medications   Medication     acetaminophen-codeine (TYLENOL #3) 300-30 MG tablet     gabapentin (NEURONTIN) 300 MG capsule     methylcellulose (CITRUCEL) powder     naproxen (NAPROSYN) 500 MG tablet     polyethylene glycol (MIRALAX) 17 GM/Dose powder     senna-docusate (SENNOSIDES-DOCUSATE SODIUM) 8.6-50 mg tablet     simethicone (MYLICON) 125 MG chewable tablet     No current facility-administered medications for this visit.       No Known Allergies    No past medical history on file.     Review of Systems  ROS:  Specifically negative for bowel/bladder dysfunction, balance changes, headache, dizziness, foot drop, fevers, chills, appetite changes, nausea/vomiting, unexplained weight loss. Otherwise 13 systems reviewed are negative. Please see the patient's intake questionnaire from today for details.    Reviewed Social, Family, Past Medical and Past Surgical history with patient, no significant changes noted since prior visit.     Objective:     /72 (BP Location: Right arm, Patient Position: Sitting)     PHYSICAL EXAMINATION:    --CONSTITUTIONAL: Well developed, well nourished, healthy appearing individual.  --PSYCHIATRIC: Appropriate mood and affect. No difficulty interacting due to temper, social withdrawal, or memory issues.  --SKIN: Lumbar region is dry and intact.   --RESPIRATORY: Normal rhythm and effort. No abnormal accessory muscle breathing  patterns noted.   --MUSCULOSKELETAL:  Normal lumbar lordosis noted, no lateral shift.  --GROSS MOTOR: Easily arises from a seated position. Gait is non-antalgic  --LUMBAR SPINE:  Inspection reveals no evidence of deformity. metric. Sensation to light touch is intact in the bilateral L4, L5, and S1 dermatomes.    RESULTS:   Imaging: Lumbar spine imaging was reviewed today. The images were shown to the patient and the findings were explained using a spine model.    MR Lumbar Spine w/o Contrast  Result Date: 10/21/2021  EXAM: MR LUMBAR SPINE W/O CONTRAST LOCATION: [facility] DATE/TIME: 10/17/2021 3:09 PM INDICATION: Low back pain, > 6 wks COMPARISON: None. TECHNIQUE: Routine Lumbar Spine MRI without IV contrast. FINDINGS: Nomenclature is based on five lumbar-type vertebral bodies. Vertebral body heights are unremarkable. There is no evidence of a marrow-replacing process. No pathologic bony edema is demonstrated. The visualized proximal femora, sacrum and elodia appear intact. Alignment is unremarkable. The conus terminates at L2-L3. No signal abnormalities of the visualized cord or cauda equina nerve roots are demonstrated. The paraspinal soft tissues are unremarkable. Limited images of the abdominal cavity demonstrate no acute abnormality. There is no evidence of abdominal aortic aneurysm. T12-L1: Normal disc height and signal. No herniation. Normal facets. No spinal canal or neural foraminal stenosis. L1-L2: Moderate intervertebral disc height loss with disc desiccation. Shallow bulge and irregular broad-based central extrusion with predominantly superiorly directed components. No significant facet arthropathy. Severe spinal canal stenosis. No significant neural foraminal stenosis. L2-L3: Normal disc height and signal. No herniation. Normal facets. No spinal canal or neural foraminal stenosis. L3-L4: Normal disc height and signal. No herniation. Normal facets. No spinal canal or neural  foraminal stenosis. L4-L5: Unremarkable disc height and signal. No herniation. Mild facet arthropathy. No spinal canal stenosis or neural foraminal stenosis. L5-S1: Normal disc height and signal. No herniation. Mild facet arthropathy. No spinal canal or neural foraminal stenosis.   IMPRESSION: 1.  At L1-L2, a central disc extrusion contributes to severe spinal canal stenosis. 2.  Additional findings as above without high-grade neural foraminal stenosis or other sites of high-grade spinal canal stenosis       XR Hip Right 2-3 Views  Result Date: 9/22/2021  EXAM: XR HIP RIGHT 2-3 VIEWS LOCATION: [facility] DATE/TIME: 9/22/2021 10:19 AM INDICATION:  Right groin pain COMPARISON: None.   IMPRESSION: Normal joint spaces and alignment. No fracture. No degenerative changes.                        Again, thank you for allowing me to participate in the care of your patient.        Sincerely,        Liberty Bradley, CNP

## 2021-10-28 ENCOUNTER — OFFICE VISIT (OUTPATIENT)
Dept: FAMILY MEDICINE | Facility: CLINIC | Age: 31
End: 2021-10-28
Payer: COMMERCIAL

## 2021-10-28 VITALS
DIASTOLIC BLOOD PRESSURE: 70 MMHG | HEART RATE: 60 BPM | TEMPERATURE: 97.5 F | BODY MASS INDEX: 27.65 KG/M2 | WEIGHT: 139 LBS | SYSTOLIC BLOOD PRESSURE: 112 MMHG | OXYGEN SATURATION: 98 %

## 2021-10-28 DIAGNOSIS — M62.89 PELVIC FLOOR INSTABILITY: Primary | ICD-10-CM

## 2021-10-28 DIAGNOSIS — N89.8 VAGINAL DISCHARGE: ICD-10-CM

## 2021-10-28 DIAGNOSIS — K59.09 CHRONIC CONSTIPATION: ICD-10-CM

## 2021-10-28 DIAGNOSIS — M51.26 LUMBAR DISC HERNIATION: ICD-10-CM

## 2021-10-28 LAB
CLUE CELLS: PRESENT
TRICHOMONAS, WET PREP: ABNORMAL
WBC'S/HIGH POWER FIELD, WET PREP: ABNORMAL
YEAST, WET PREP: ABNORMAL

## 2021-10-28 PROCEDURE — 99215 OFFICE O/P EST HI 40 MIN: CPT | Performed by: FAMILY MEDICINE

## 2021-10-28 PROCEDURE — 87210 SMEAR WET MOUNT SALINE/INK: CPT | Performed by: FAMILY MEDICINE

## 2021-10-28 PROCEDURE — 87491 CHLMYD TRACH DNA AMP PROBE: CPT | Performed by: FAMILY MEDICINE

## 2021-10-28 PROCEDURE — 87591 N.GONORRHOEAE DNA AMP PROB: CPT | Performed by: FAMILY MEDICINE

## 2021-10-28 NOTE — PROGRESS NOTES
ASSESMENT AND PLAN:  Diagnoses and all orders for this visit:  Probable pelvic floor instability  On exam there is no visible rectal prolapse, no fissures, no external hemorrhoids.  There is no vaginal prolapse.  Speculum exam and pelvic exam normal.  Checking labs as detailed below.  Extensive counseling done with the patient on differential diagnosis and options for plan with the help of a professional .  Given that this has been going on ever since her childbirth I think she would benefit from initiation of daily Kegel exercises.  Counseling done on how to initiate that and follow-up in about 6 weeks at which time if she is not great making progress we can send her to see a pelvic .    Chronic constipation  Well controlled with her current stool softener regiment.  Continue.    Lumbar disc herniation  Counseling done with the patient and we reviewed her MRI results in detail.  We reviewed the actual images in the room today.  Counseled her on the importance of neurosurgery follow-up as had been recommended by her spine specialist.  She will also follow-up with the spine clinic as directed.  Also counseled the patient that some of her symptoms that she is experiencing in the low back that move into the buttocks and pelvic area could be related to her spine disease.    Vaginal discharge  -     Wet prep - Clinic Collect  -     NEISSERIA GONORRHOEA PCR; Future  -     CHLAMYDIA TRACHOMATIS PCR; Future      Reviewed the risks and benefits of the treatment plan with the patient and/or caregiver and we discussed indications for routine and emergent follow-up.    52 minutes spent on the date of the encounter doing chart review, patient visit and documentation and face to face counseling on above.     SUBJECTIVE: 30-year-old female is new to me.  She has been seen recently at the spine clinic and had an MRI that was positive for significant lumbar disc herniation.  She is also been treated for  "constipation and has noticed that her stools have now become soft and regular and the acute pain she was having with bowel movements has resolved.  However, she continues to have a pretty noticeable sensation of fullness or pressure as well as a sensation of \"something coming out\" of both the vaginal and rectal area when she bears down.  Previously she been having painful bowel movements but that has gotten better and she has not been having to bear down since she has been on her constipation treatment.  The symptoms wax and wane.  She is wondering about getting an MRI of her pelvis to further evaluate this.  We were able to pull up her recent spine MRI and review the report in detail and also review the actual images in detail.  There was no evidence of cauda equina.  No soft tissue issues.    No past medical history on file.  Patient Active Problem List   Diagnosis     Implantable Contraceptive Capsules - Insertion     Chronic constipation     Screening for cervical cancer     Lumbar disc herniation     Current Outpatient Medications   Medication Sig Dispense Refill     acetaminophen-codeine (TYLENOL #3) 300-30 MG tablet Take 1 tablet by mouth every 8 hours as needed for severe pain (Patient not taking: Reported on 10/28/2021) 21 tablet 0     gabapentin (NEURONTIN) 300 MG capsule Take 1 capsule (300 mg) by mouth At Bedtime Follow Dosing Chart (Patient not taking: Reported on 10/28/2021) 90 capsule 1     methylcellulose (CITRUCEL) powder Take 2 g by mouth daily (Patient not taking: Reported on 10/28/2021) 454 g 0     naproxen (NAPROSYN) 500 MG tablet Take 1 tablet (500 mg) by mouth 2 times daily (with meals) (Patient not taking: Reported on 10/28/2021) 60 tablet 4     polyethylene glycol (MIRALAX) 17 GM/Dose powder Take 17 g by mouth daily (Patient not taking: Reported on 10/28/2021) 510 g 1     senna-docusate (SENNOSIDES-DOCUSATE SODIUM) 8.6-50 mg tablet [SENNA-DOCUSATE (SENNOSIDES-DOCUSATE SODIUM) 8.6-50 MG " TABLET] Take 1 tablet by mouth 2 (two) times a day. (Patient not taking: Reported on 10/28/2021) 90 tablet 0     simethicone (MYLICON) 125 MG chewable tablet [SIMETHICONE (MYLICON) 125 MG CHEWABLE TABLET] Chew 1 tablet (125 mg total) every 6 (six) hours as needed for flatulence. (Patient not taking: Reported on 10/28/2021) 30 tablet 1     History   Smoking Status     Never Smoker   Smokeless Tobacco     Current User     Comment: Betel nut       OBJECTICE: /70 (BP Location: Left arm, Patient Position: Sitting, Cuff Size: Adult Regular)   Pulse 60   Temp 97.5  F (36.4  C) (Oral)   Wt 63 kg (139 lb)   LMP  (LMP Unknown)   SpO2 98%   BMI 27.65 kg/m       Recent Results (from the past 24 hour(s))   Wet prep - Clinic Collect    Collection Time: 10/28/21 11:08 AM    Specimen: Vagina; Swab   Result Value Ref Range    Trichomonas Absent Absent    Yeast Absent Absent    Clue Cells Present (A) Absent    WBCs/high power field 1+ (A) None        GEN-alert, appropriate, in no apparent distress   CV-regular rate and rhythm with no murmur   RESP-lungs clear to auscultation   Pelvic-(pelvic skin and  exam done after extensive counseling with the patient explaining to her the details of the exam and done with the presence of Kitty CASTILLO in the room with the patient and myself) normal bimanual exam.  Nontender.  No palpable mass.  There is some white vaginal discharge that appears to be more than physiologic.  No cervical motion tenderness.  External genitalia appear normal.  No visible or palpable vaginal prolapse.   Skin-anal and perianal skin is normal.  No external hemorrhoids.  No fissure.  No prolapse.    Antonio Acosta MD

## 2021-10-29 LAB
C TRACH DNA SPEC QL NAA+PROBE: NEGATIVE
N GONORRHOEA DNA SPEC QL NAA+PROBE: NEGATIVE

## 2021-10-31 DIAGNOSIS — B96.89 BV (BACTERIAL VAGINOSIS): Primary | ICD-10-CM

## 2021-10-31 DIAGNOSIS — N76.0 BV (BACTERIAL VAGINOSIS): Primary | ICD-10-CM

## 2021-10-31 RX ORDER — METRONIDAZOLE 500 MG/1
500 TABLET ORAL 2 TIMES DAILY
Qty: 14 TABLET | Refills: 0 | Status: SHIPPED | OUTPATIENT
Start: 2021-10-31 | End: 2021-11-07

## 2021-11-02 ENCOUNTER — TELEPHONE (OUTPATIENT)
Dept: FAMILY MEDICINE | Facility: CLINIC | Age: 31
End: 2021-11-02

## 2021-11-02 NOTE — TELEPHONE ENCOUNTER
----- Message from Antonio Acosta MD sent at 10/31/2021  8:00 PM CDT -----  Please review the test results with the patient.  Positive for clue cells which indicates a nonsexually transmitted bacterial vaginal infection.  Some of her symptoms may be related to this and I would recommend that she has a treatment of metronidazole 1 pill twice per day for 7 days.  Prescription sent to her pharmacy.  Thanks.

## 2021-11-08 NOTE — PROGRESS NOTES
Assessment:     Diagnoses and all orders for this visit:  Chronic bilateral low back pain with right-sided sciatica  Right lumbar radiculitis  Lumbar disc herniation  Spinal stenosis of lumbar region without neurogenic claudication     Cindy Ford is a 30 year old y.o. female with past medical history significant for constipation who presents today for follow-up regarding:    -Chronic bilateral back pain with right lumbar radiculitis with paresthesias into the groin lower abdomen and lateral hip.     Plan:     A shared decision making plan was used. The patient's values and choices were respected. Prior medical records were reviewed today. The following represents what was discussed and decided upon by the provider and the patient.        -DIAGNOSTIC TESTS: Images were personally reviewed and interpreted.   --Lumbar spine MRI 10/17/2021 with L1-2 degenerative disc changes with disc extrusion with severe spinal stenosis.  No significant foraminal stenosis.  --Right hip x-ray 9/22/2021 with normal joint space and alignment.  Right SI joint unremarkable.    -INTERVENTIONS: Consider injections if patient is a nonsurgical candidate.    -Referral placed for surgical opinion 12/21/2021, advised patient I would recommend consultation at this time before considering further conservative treatments given severe spinal stenosis.    -MEDICATIONS: No changes in medications at this time  Discussed side effects of medications and proper use. Patient verbalized understanding.    -PHYSICAL THERAPY: Patient is scheduled to start physical therapy as well 11/12/2021, advised that it would be safe to do this at this time as well.  Discussed the importance of core strengthening, ROM, stretching exercises with the patient and how each of these entities is important in decreasing pain.  Explained to the patient that the purpose of physical therapy is to teach the patient a home exercise program.  These exercises need to be performed every  day in order to decrease pain and prevent future occurrences of pain.        -PATIENT EDUCATION:  Total time of 32 Minutes, on the day of service, spent with the patient, reviewing the chart, placing orders, and documenting.   -Today we also discussed the issues related to the current COVID-19 pandemic, the pros and cons of the current treatment plan, the CDC guidelines such as social distancing, washing the hands, and covering the cough.    -FOLLOW UP: Follow-up with [facility] neurosurgery for surgery consult  Advised to contact clinic if symptoms worsen or change.    Subjective:     Cindy Ford is a 30 year old female who presents today for follow-up regarding ongoing bilateral low back pain right greater than left rating to the right groin and lower abdomen area that is still significant.  Currently pain is an 8/10 no matter her position but does worsen with sitting for long, does improve with standing and with pain medications.  Patient denies any recent trips falls or balance changes, denies bowel or bladder loss control, denies saddle anesthesia.    Patient's sister who does speak English was present today during entire visit and added to past medical/surgical/family/social history and history of presenting illness.    was present during entire visit today.     -Treatment to Date: No prior spinal surgery or spinal injection  Chiropractic treatments September 2021 with no benefit     -Medications:  Naproxen 500 mg with minimal benefit  Gabapentin 300 mg    Patient Active Problem List   Diagnosis     Implantable Contraceptive Capsules - Insertion     Chronic constipation     Screening for cervical cancer     Lumbar disc herniation       Current Outpatient Medications   Medication     senna-docusate (SENNOSIDES-DOCUSATE SODIUM) 8.6-50 mg tablet     No current facility-administered medications for this visit.       No Known Allergies    No past medical history on file.     Review of Systems  ROS:   Specifically negative for bowel/bladder dysfunction, balance changes, headache, dizziness, foot drop, fevers, chills, appetite changes, nausea/vomiting, unexplained weight loss. Otherwise 13 systems reviewed are negative. Please see the patient's intake questionnaire from today for details.    Reviewed Social, Family, Past Medical and Past Surgical history with patient, no significant changes noted since prior visit.     Objective:     /85 (BP Location: Right arm, Patient Position: Sitting)   Pulse 79   LMP  (LMP Unknown)   SpO2 99%     PHYSICAL EXAMINATION:    --CONSTITUTIONAL: Well developed, well nourished, healthy appearing individual.  --PSYCHIATRIC: Appropriate mood and affect. No difficulty interacting due to temper, social withdrawal, or memory issues.  --SKIN: Lumbar region is dry and intact.   --RESPIRATORY: Normal rhythm and effort. No abnormal accessory muscle breathing patterns noted.   --MUSCULOSKELETAL:  Normal lumbar lordosis noted, no lateral shift.  --GROSS MOTOR: Easily arises from a seated position. Gait is non-antalgic  --LUMBAR SPINE:  Inspection reveals no evidence of deformity.   --LOWER EXTREMITY MOTOR TESTING:  Plantar flexion left 5/5, right 5/5   Dorsiflexion left 5/5, right 5/5   Great toe MTP extension left 5/5, right 5/5  Knee flexion left 5/5, right 5/5  Knee extension left 5/5, right 5/5   Hip flexion left 5/5, right 5/5  Hip abduction left 5/5, right 5/5  Hip adduction left 5/5, right 5/5   --HIPS: Full range of motion bilaterally.   --NEUROLOGIC: Bilateral patellar and achilles reflexes are physiologic and symmetric. Sensation to light touch is intact in the bilateral L4, L5, and S1 dermatomes.    RESULTS:   Imaging: Lumbar spine imaging was reviewed today. The images were shown to the patient and the findings were explained using a spine model.      MR Lumbar Spine w/o Contrast  Result Date: 10/21/2021  EXAM: MR LUMBAR SPINE W/O CONTRAST LOCATION: [facility] DATE/TIME: 10/17/2021 3:09 PM INDICATION: Low back pain, > 6 wks COMPARISON: None. TECHNIQUE: Routine Lumbar Spine MRI without IV contrast. FINDINGS: Nomenclature is based on five lumbar-type vertebral bodies. Vertebral body heights are unremarkable. There is no evidence of a marrow-replacing process. No pathologic bony edema is demonstrated. The visualized proximal femora, sacrum and elodia appear intact. Alignment is unremarkable. The conus terminates at L2-L3. No signal abnormalities of the visualized cord or cauda equina nerve roots are demonstrated. The paraspinal soft tissues are unremarkable. Limited images of the abdominal cavity demonstrate no acute abnormality. There is no evidence of abdominal aortic aneurysm. T12-L1: Normal disc height and signal. No herniation. Normal facets. No spinal canal or neural foraminal stenosis. L1-L2: Moderate intervertebral disc height loss with disc desiccation. Shallow bulge and irregular broad-based central extrusion with predominantly superiorly directed components. No significant facet arthropathy. Severe spinal canal stenosis. No significant neural foraminal stenosis. L2-L3: Normal disc height and signal. No herniation. Normal facets. No spinal canal or neural foraminal stenosis. L3-L4: Normal disc height and signal. No herniation. Normal facets. No spinal canal or neural foraminal stenosis. L4-L5: Unremarkable disc height and signal. No herniation. Mild facet arthropathy. No spinal canal stenosis or neural foraminal stenosis. L5-S1: Normal disc height and signal. No herniation. Mild facet arthropathy. No spinal canal or neural foraminal stenosis.   IMPRESSION: 1.  At L1-L2, a central disc extrusion contributes to severe spinal canal stenosis. 2.  Additional findings as above without high-grade neural foraminal stenosis or other sites of high-grade spinal canal stenosis        XR Hip Right 2-3 Views  Result Date: 9/22/2021  EXAM: XR HIP RIGHT 2-3 VIEWS  LOCATION: [facility] DATE/TIME: 9/22/2021 10:19 AM INDICATION:  Right groin pain COMPARISON: None.   IMPRESSION: Normal joint spaces and alignment. No fracture. No degenerative changes.

## 2021-11-09 ENCOUNTER — MEDICAL CORRESPONDENCE (OUTPATIENT)
Dept: NEUROSURGERY | Facility: CLINIC | Age: 31
End: 2021-11-09

## 2021-11-09 ENCOUNTER — OFFICE VISIT (OUTPATIENT)
Dept: PHYSICAL MEDICINE AND REHAB | Facility: CLINIC | Age: 31
End: 2021-11-09
Payer: COMMERCIAL

## 2021-11-09 VITALS — OXYGEN SATURATION: 99 % | SYSTOLIC BLOOD PRESSURE: 132 MMHG | DIASTOLIC BLOOD PRESSURE: 85 MMHG | HEART RATE: 79 BPM

## 2021-11-09 DIAGNOSIS — M54.41 CHRONIC BILATERAL LOW BACK PAIN WITH RIGHT-SIDED SCIATICA: Primary | ICD-10-CM

## 2021-11-09 DIAGNOSIS — M54.16 RIGHT LUMBAR RADICULITIS: ICD-10-CM

## 2021-11-09 DIAGNOSIS — M48.061 SPINAL STENOSIS OF LUMBAR REGION WITHOUT NEUROGENIC CLAUDICATION: ICD-10-CM

## 2021-11-09 DIAGNOSIS — M51.26 LUMBAR DISC HERNIATION: ICD-10-CM

## 2021-11-09 DIAGNOSIS — G89.29 CHRONIC BILATERAL LOW BACK PAIN WITH RIGHT-SIDED SCIATICA: Primary | ICD-10-CM

## 2021-11-09 PROCEDURE — 99214 OFFICE O/P EST MOD 30 MIN: CPT | Performed by: NURSE PRACTITIONER

## 2021-11-09 ASSESSMENT — PAIN SCALES - GENERAL: PAINLEVEL: EXTREME PAIN (8)

## 2021-11-09 NOTE — LETTER
11/9/2021         RE: Cindy Ford  [address] [place] [address]        Dear Colleague,    Thank you for referring your patient, Cindy Ford, to the [facility] [place]. Please see a copy of my visit note below.      Assessment:     Diagnoses and all orders for this visit:  Chronic bilateral low back pain with right-sided sciatica  Right lumbar radiculitis  Lumbar disc herniation  Spinal stenosis of lumbar region without neurogenic claudication     Cindy Ford is a 30 year old y.o. female with past medical history significant for constipation who presents today for follow-up regarding:    -Chronic bilateral back pain with right lumbar radiculitis with paresthesias into the groin lower abdomen and lateral hip.     Plan:     A shared decision making plan was used. The patient's values and choices were respected. Prior medical records were reviewed today. The following represents what was discussed and decided upon by the provider and the patient.        -DIAGNOSTIC TESTS: Images were personally reviewed and interpreted.   --Lumbar spine MRI 10/17/2021 with L1-2 degenerative disc changes with disc extrusion with severe spinal stenosis.  No significant foraminal stenosis.  --Right hip x-ray 9/22/2021 with normal joint space and alignment.  Right SI joint unremarkable.    -INTERVENTIONS: Consider injections if patient is a nonsurgical candidate.    -Referral placed for surgical opinion 12/21/2021, advised patient I would recommend consultation at this time before considering further conservative treatments given severe spinal stenosis.    -MEDICATIONS: No changes in medications at this time  Discussed side effects of medications and proper use. Patient verbalized understanding.    -PHYSICAL THERAPY: Patient is scheduled to start physical therapy as well 11/12/2021, advised that it would be safe to do this at this time as well.  Discussed the importance of core strengthening, ROM, stretching  exercises with the patient and how each of these entities is important in decreasing pain.  Explained to the patient that the purpose of physical therapy is to teach the patient a home exercise program.  These exercises need to be performed every day in order to decrease pain and prevent future occurrences of pain.        -PATIENT EDUCATION:  Total time of 32 Minutes, on the day of service, spent with the patient, reviewing the chart, placing orders, and documenting.   -Today we also discussed the issues related to the current COVID-19 pandemic, the pros and cons of the current treatment plan, the CDC guidelines such as social distancing, washing the hands, and covering the cough.    -FOLLOW UP: Follow-up with [facility] neurosurgery for surgery consult  Advised to contact clinic if symptoms worsen or change.    Subjective:     Cindy Ford is a 30 year old female who presents today for follow-up regarding ongoing bilateral low back pain right greater than left rating to the right groin and lower abdomen area that is still significant.  Currently pain is an 8/10 no matter her position but does worsen with sitting for long, does improve with standing and with pain medications.  Patient denies any recent trips falls or balance changes, denies bowel or bladder loss control, denies saddle anesthesia.    Patient's sister who does speak English was present today during entire visit and added to past medical/surgical/family/social history and history of presenting illness.    was present during entire visit today.     -Treatment to Date: No prior spinal surgery or spinal injection  Chiropractic treatments September 2021 with no benefit     -Medications:  Naproxen 500 mg with minimal benefit  Gabapentin 300 mg    Patient Active Problem List   Diagnosis     Implantable Contraceptive Capsules - Insertion     Chronic constipation     Screening for cervical cancer     Lumbar disc herniation       Current Outpatient  Medications   Medication     senna-docusate (SENNOSIDES-DOCUSATE SODIUM) 8.6-50 mg tablet     No current facility-administered medications for this visit.       No Known Allergies    No past medical history on file.     Review of Systems  ROS:  Specifically negative for bowel/bladder dysfunction, balance changes, headache, dizziness, foot drop, fevers, chills, appetite changes, nausea/vomiting, unexplained weight loss. Otherwise 13 systems reviewed are negative. Please see the patient's intake questionnaire from today for details.    Reviewed Social, Family, Past Medical and Past Surgical history with patient, no significant changes noted since prior visit.     Objective:     /85 (BP Location: Right arm, Patient Position: Sitting)   Pulse 79   LMP  (LMP Unknown)   SpO2 99%     PHYSICAL EXAMINATION:    --CONSTITUTIONAL: Well developed, well nourished, healthy appearing individual.  --PSYCHIATRIC: Appropriate mood and affect. No difficulty interacting due to temper, social withdrawal, or memory issues.  --SKIN: Lumbar region is dry and intact.   --RESPIRATORY: Normal rhythm and effort. No abnormal accessory muscle breathing patterns noted.   --MUSCULOSKELETAL:  Normal lumbar lordosis noted, no lateral shift.  --GROSS MOTOR: Easily arises from a seated position. Gait is non-antalgic  --LUMBAR SPINE:  Inspection reveals no evidence of deformity.   --LOWER EXTREMITY MOTOR TESTING:  Plantar flexion left 5/5, right 5/5   Dorsiflexion left 5/5, right 5/5   Great toe MTP extension left 5/5, right 5/5  Knee flexion left 5/5, right 5/5  Knee extension left 5/5, right 5/5   Hip flexion left 5/5, right 5/5  Hip abduction left 5/5, right 5/5  Hip adduction left 5/5, right 5/5   --HIPS: Full range of motion bilaterally.   --NEUROLOGIC: Bilateral patellar and achilles reflexes are physiologic and symmetric. Sensation to light touch is intact in the bilateral L4, L5, and S1 dermatomes.    RESULTS:   Imaging: Lumbar spine  imaging was reviewed today. The images were shown to the patient and the findings were explained using a spine model.      MR Lumbar Spine w/o Contrast  Result Date: 10/21/2021  EXAM: MR LUMBAR SPINE W/O CONTRAST LOCATION: [facility] DATE/TIME: 10/17/2021 3:09 PM INDICATION: Low back pain, > 6 wks COMPARISON: None. TECHNIQUE: Routine Lumbar Spine MRI without IV contrast. FINDINGS: Nomenclature is based on five lumbar-type vertebral bodies. Vertebral body heights are unremarkable. There is no evidence of a marrow-replacing process. No pathologic bony edema is demonstrated. The visualized proximal femora, sacrum and elodia appear intact. Alignment is unremarkable. The conus terminates at L2-L3. No signal abnormalities of the visualized cord or cauda equina nerve roots are demonstrated. The paraspinal soft tissues are unremarkable. Limited images of the abdominal cavity demonstrate no acute abnormality. There is no evidence of abdominal aortic aneurysm. T12-L1: Normal disc height and signal. No herniation. Normal facets. No spinal canal or neural foraminal stenosis. L1-L2: Moderate intervertebral disc height loss with disc desiccation. Shallow bulge and irregular broad-based central extrusion with predominantly superiorly directed components. No significant facet arthropathy. Severe spinal canal stenosis. No significant neural foraminal stenosis. L2-L3: Normal disc height and signal. No herniation. Normal facets. No spinal canal or neural foraminal stenosis. L3-L4: Normal disc height and signal. No herniation. Normal facets. No spinal canal or neural foraminal stenosis. L4-L5: Unremarkable disc height and signal. No herniation. Mild facet arthropathy. No spinal canal stenosis or neural foraminal stenosis. L5-S1: Normal disc height and signal. No herniation. Mild facet arthropathy. No spinal canal or neural foraminal stenosis.   IMPRESSION: 1.  At L1-L2, a central disc extrusion contributes to  severe spinal canal stenosis. 2.  Additional findings as above without high-grade neural foraminal stenosis or other sites of high-grade spinal canal stenosis        XR Hip Right 2-3 Views  Result Date: 9/22/2021  EXAM: XR HIP RIGHT 2-3 VIEWS LOCATION: [facility] DATE/TIME: 9/22/2021 10:19 AM INDICATION:  Right groin pain COMPARISON: None.   IMPRESSION: Normal joint spaces and alignment. No fracture. No degenerative changes.                      Again, thank you for allowing me to participate in the care of your patient.        Sincerely,        Liberty Bradley, CNP

## 2021-11-09 NOTE — PATIENT INSTRUCTIONS
You have been referred to [facility] for surgical consult.  They will callyou to schedule an appointment at the [facility].    Neurosurgery Scheduling phone #: [phone]

## 2021-11-12 ENCOUNTER — HOSPITAL ENCOUNTER (OUTPATIENT)
Dept: PHYSICAL THERAPY | Facility: REHABILITATION | Age: 31
End: 2021-11-12
Attending: NURSE PRACTITIONER
Payer: COMMERCIAL

## 2021-11-12 ENCOUNTER — OFFICE VISIT (OUTPATIENT)
Dept: NEUROSURGERY | Facility: CLINIC | Age: 31
End: 2021-11-12
Attending: NURSE PRACTITIONER
Payer: COMMERCIAL

## 2021-11-12 VITALS
DIASTOLIC BLOOD PRESSURE: 109 MMHG | WEIGHT: 139 LBS | SYSTOLIC BLOOD PRESSURE: 145 MMHG | BODY MASS INDEX: 27.29 KG/M2 | HEIGHT: 60 IN | HEART RATE: 83 BPM | OXYGEN SATURATION: 97 %

## 2021-11-12 DIAGNOSIS — G89.29 CHRONIC BILATERAL LOW BACK PAIN WITH RIGHT-SIDED SCIATICA: ICD-10-CM

## 2021-11-12 DIAGNOSIS — M54.16 RIGHT LUMBAR RADICULITIS: ICD-10-CM

## 2021-11-12 DIAGNOSIS — M54.41 CHRONIC BILATERAL LOW BACK PAIN WITH RIGHT-SIDED SCIATICA: ICD-10-CM

## 2021-11-12 DIAGNOSIS — M51.26 LUMBAR DISC HERNIATION: Primary | ICD-10-CM

## 2021-11-12 PROCEDURE — 97110 THERAPEUTIC EXERCISES: CPT | Mod: GP | Performed by: PHYSICAL THERAPIST

## 2021-11-12 PROCEDURE — 97161 PT EVAL LOW COMPLEX 20 MIN: CPT | Mod: GP | Performed by: PHYSICAL THERAPIST

## 2021-11-12 PROCEDURE — 99203 OFFICE O/P NEW LOW 30 MIN: CPT | Performed by: SURGERY

## 2021-11-12 PROCEDURE — 97140 MANUAL THERAPY 1/> REGIONS: CPT | Mod: GP | Performed by: PHYSICAL THERAPIST

## 2021-11-12 RX ORDER — ACETAMINOPHEN 325 MG/1
325-650 TABLET ORAL EVERY 6 HOURS PRN
COMMUNITY

## 2021-11-12 ASSESSMENT — MIFFLIN-ST. JEOR: SCORE: 1249.06

## 2021-11-12 NOTE — NURSING NOTE
Neurosurgery consultation was requested by: Liberty Bradley CNP    Pain: low back pain   Radicular Pain is present: bilateral buttock pain and bilateral groin pain   Lhermitte sign: no  Motor complaints: no  Sensory complaints: denies  Gait and balance issues: denies  Bowel or bladder issues:  rectal numbness, does not feel urge to have bowel movement   Duration of SX is: 2 years   The symptoms are worse with: with constipation   The symptoms are better with: nothing   Injury: started after giving birth   Severity is:chronic   Patient has tried the following conservative measures: just starting PT  TAN score is:  JShowen,JONATHAN

## 2021-11-12 NOTE — PROGRESS NOTES
[facility]      OUTPATIENT PHYSICAL THERAPY ORTHOPEDIC EVALUATION  PLAN OF TREATMENT FOR OUTPATIENT REHABILITATION  (COMPLETE FOR INITIAL CLAIMS ONLY)  Patient's Last Name, First Name, M.I.  YOB: 1990  Cindy Ford    Provider s Name:  [facility]   Medical Record No.  [id number]   Start of Care Date:  11/12/21   Onset Date:  09/22/21   Type:     _X__PT   ___OT   ___SLP Medical Diagnosis:  Lumbar radiculopathy,, lumbar stenosis      PT Diagnosis:  Lumbar radiculopathy    Visits from SOC:  1      _________________________________________________________________________________  Plan of Treatment/Functional Goals:  joint mobilization,neuromuscular re-education,manual therapy,ROM,strengthening,stretching     Cryotherapy,Hot packs,TENS,Traction,Ultrasound     Goals  Goal Identifier: HEP   Goal Description: Pateint will demonstrate independence in HEP in 12 weeks  Target Date: 02/04/22    Goal Identifier: Standing  Goal Description: Patient will increase standing tolerance to >30 min for increase ease in ADLs and home chores in 12 weeks  Target Date: 02/04/22    Goal Identifier: Bending and lifting  Goal Description: Patient will report increase ease in lifting >30# with pain less than 4/10 for increase ease in home tasks and care of children in 12 weeks   Target Date: 02/04/22        Therapy Frequency:  1 time/week  Predicted Duration of Therapy Intervention:  12 weeks    Breanna Root, PT                 I CERTIFY THE NEED FOR THESE SERVICES FURNISHED UNDER        THIS PLAN OF TREATMENT AND WHILE UNDER MY CARE     (Physician co-signature of this document indicates review and certification of the therapy plan).                       Certification Date From:  11/12/21   Certification Date To:  02/04/22    Referring Provider:  Liberty Bradley CNP    Initial Assessment        See Epic Evaluation Start of Care Date: 11/12/21                   11/12/21 1000   General Information   Type of Visit Initial OP Ortho PT Evaluation   Start of Care Date 11/12/21   Referring Physician Liberty Bradley CNP   Patient/Family Goals Statement to decrease her pain   Orders Evaluate and Treat   Date of Order 09/22/21   Medical Diagnosis Lumbar radiculitis, lumbar stenosis    Precautions/Limitations no known precautions/limitations   Weight-Bearing Status - LUE full weight-bearing   Weight-Bearing Status - RUE full weight-bearing   Weight-Bearing Status - LLE full weight-bearing   Weight-Bearing Status - RLE full weight-bearing       Present Yes   Language Other  (Tamym - in person  for the evaluation )   Body Part(s)   Body Part(s) Lumbar Spine/SI   Presentation and Etiology   Pertinent history of current problem (include personal factors and/or comorbidities that impact the POC) Patient reports the pain has been present for 1.5 years since she delivered she baby. she did have some pain before then as well. negative xrays for her hip, MRI fo the spine done recently and found severe stenosis in the low back, she has a neuro surgeon consult today this afternoon. Abdominal pain present and reports some consitipation.    Impairments B. Decreased WB tolerance;D. Decreased ROM;E. Decreased flexibility;F. Decreased strength and endurance   Functional Limitations perform activities of daily living   Symptom Location Bilateral low back pain into the hips and groin,   How/Where did it occur From insidious onset;From Degenerative Joint Disease   Onset date of current episode/exacerbation 09/22/21   Chronicity Chronic   Pain rating (0-10 point scale) Best (/10);Worst (/10)   Best (/10) 3   Worst (/10) 8   Pain quality C. Aching;D. Burning   Frequency of pain/symptoms A. Constant   Pain/symptoms are: The same all the time   Pain/symptoms exacerbated by C. Lifting;I. Bending;D. Carrying;G. Certain positions   Pain/symptoms eased by C. Rest;E.  Changing positions   Progression of symptoms since onset: Worsened   Current / Previous Interventions   Diagnostic Tests: X-ray;MRI   X-ray Results unremarkable   MRI Results Results   MRI results see imaging for details - 1.  At L1-L2, a central disc extrusion contributes to severe spinal canal stenosis.   Current Level of Function   Current Community Support Family/friend caregiver   Patient role/employment history C. 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 No   Have you fallen and had an injury in the past year? No   Is patient a fall risk? No   Abuse Screen (yes response referral indicated)   Feels Unsafe at Home or Work/School no   Feels Threatened by Someone no   Does Anyone Try to Keep You From Having Contact with Others or Doing Things Outside Your Home? no   Physical Signs of Abuse Present no   System Outcome Measures   Outcome Measures Low Back Pain (see Oswestry and Chris)  (TAN 68%)   Lumbar Spine/SI Objective Findings   Gait/Locomotion slightly antalgic and guarded    Balance/Proprioception (Single Leg Stance) able to SL stance >30 , no increase ein pain, no hip drop noted    Flexion ROM to floor pain in buttock when returning to stand   Extension ROM WNL - pain in central lower back   Right Side Bending ROM WNL min pain on L side    Left Side Bending ROM WNL min on the R side    Repeated Extension-Standing ROM no change in symptoms    Repeated Flexion-Standing ROM no change in symptoms   Pelvic Screen normal alignment of pelvic landmarks in supine    Hip Screen negative scour, PARDEEP/FADIR and WNL PROM all motions    Lumbar/Hip/Knee/Foot Strength Comments able to heel and toe walk without difficulty, mild calf pain on  the right side, MMT on B LEs, WNL and pain free today    Hamstring Flexibility WNL   Hip Flexor Flexibility min tightness B    Piriformis Flexibility min tightness B    SLR neg   Ernesto Test neg   Crossover SLR neg   Slump Test mild pain into the L low back, pain in knees B   Sensation Testing WNL to light touch in B LEs   Planned Therapy Interventions   Planned Therapy Interventions joint mobilization;neuromuscular re-education;manual therapy;ROM;strengthening;stretching   Planned Modality Interventions   Planned Modality Interventions Cryotherapy;Hot packs;TENS;Traction;Ultrasound   Clinical Impression   Criteria for Skilled Therapeutic Interventions Met yes, treatment indicated   PT Diagnosis Lumbar radiculopathy    Influenced by the following impairments pain, weakness, muscle tension    Functional limitations due to impairments decreased bending, lifting, walking and standing   Clinical Presentation Stable/Uncomplicated   Clinical Decision Making (Complexity) Low complexity   Therapy Frequency 1 time/week   Predicted Duration of Therapy Intervention (days/wks) 12 weeks   Risk & Benefits of therapy have been explained Yes   Patient, Family & other staff in agreement with plan of care Yes   Clinical Impression Comments Patient present for therapy with complaints of the low back pain in the hips and groin and lower abdomen. Ta starting after the delivery of her last child at least 1.5 years ago. uncomplicated. complains of constipation and difficulty with bowel movements. she has an appointment with another provider in [place] for pelvic floor in December. she had an MRI of the low back and it demonstrated a large disc protrusion with central stenosis. Patient has appointment with the neuro surgeron this afternoon as well. Patient will benefit from skilled therapy to address hip and core weakness and increase atability of the hips and spine to decrease pain and improve functional activity tolerance    Education Assessment   Preferred Learning Style Listening;Pictures/video;Demonstration   Barriers to Learning Language;No barriers   ORTHO GOALS   PT Ortho Eval Goals 1;2;3   Ortho Goal 1   Goal Identifier HEP    Goal Description Pateint will demonstrate independence in HEP in 12 weeks   Target Date 02/04/22   Ortho Goal 2   Goal Identifier Standing   Goal Description Patient will increase standing tolerance to >30 min for increase ease in ADLs and home chores in 12 weeks   Target Date 02/04/22   Ortho Goal 3   Goal Identifier Bending and lifting   Goal Description Patient will report increase ease in lifting >30# with pain less than 4/10 for increase ease in home tasks and care of children in 12 weeks    Target Date 02/04/22   Total Evaluation Time   PT Eval, Low Complexity Minutes (12042) 30   Therapy Certification   Certification date from 11/12/21   Certification date to 02/04/22   Medical Diagnosis Lumbar radiculopathy,, lumbar stenosis      Breanna Root, PT, DPT, CLT-WILLIAM

## 2021-11-12 NOTE — PROGRESS NOTES
The patient is a 31-year-old female.  She is here with her sister who speaks English.  The patient has pain in her low back and going out into her hips and pelvis and groin.  She gets some pain down the legs, worse on the right.  She does not have any leg weakness.  She does not have any leg numbness.  She has not lost bladder control or bowel control.  I showed the MRI pictures to the patient and her sister.  She has a large disc herniation at L1-2 causing spinal cord compression.  She probably needs a discectomy.  The patient however is reluctant to have surgery.  She wants to try conservative treatment first.  She also has an appointment with some sort of pelvic floor doctor.  The patient and her sister are going to return in 2 to 3 weeks for a follow-up visit, sooner as needed.  Total time 30 minutes, more than 50% spent counseling and/or coordinating care.

## 2021-11-12 NOTE — LETTER
11/12/2021         RE: Cindy Ford  [address] [place] [address]        Dear Colleague,    Thank you for referring your patient, Cindy Ford, to the [facility]. Please see a copy of my visit note below.    The patient is a 31-year-old female.  She is here with her sister who speaks English.  The patient has pain in her low back and going out into her hips and pelvis and groin.  She gets some pain down the legs, worse on the right.  She does not have any leg weakness.  She does not have any leg numbness.  She has not lost bladder control or bowel control.  I showed the MRI pictures to the patient and her sister.  She has a large disc herniation at L1-2 causing spinal cord compression.  She probably needs a discectomy.  The patient however is reluctant to have surgery.  She wants to try conservative treatment first.  She also has an appointment with some sort of pelvic floor doctor.  The patient and her sister are going to return in 2 to 3 weeks for a follow-up visit, sooner as needed.  Total time 30 minutes, more than 50% spent counseling and/or coordinating care.      Again, thank you for allowing me to participate in the care of your patient.        Sincerely,        Avila Holder MD

## 2021-12-02 ENCOUNTER — HOSPITAL ENCOUNTER (OUTPATIENT)
Dept: PHYSICAL THERAPY | Facility: REHABILITATION | Age: 31
End: 2021-12-02
Payer: COMMERCIAL

## 2021-12-02 DIAGNOSIS — M54.16 RIGHT LUMBAR RADICULITIS: ICD-10-CM

## 2021-12-02 DIAGNOSIS — G89.29 CHRONIC BILATERAL LOW BACK PAIN WITH RIGHT-SIDED SCIATICA: Primary | ICD-10-CM

## 2021-12-02 DIAGNOSIS — M54.41 CHRONIC BILATERAL LOW BACK PAIN WITH RIGHT-SIDED SCIATICA: Primary | ICD-10-CM

## 2021-12-02 PROCEDURE — 97110 THERAPEUTIC EXERCISES: CPT | Mod: GP | Performed by: PHYSICAL THERAPY ASSISTANT

## 2021-12-09 ENCOUNTER — HOSPITAL ENCOUNTER (OUTPATIENT)
Dept: PHYSICAL THERAPY | Facility: REHABILITATION | Age: 31
End: 2021-12-09
Payer: COMMERCIAL

## 2021-12-09 DIAGNOSIS — G89.29 CHRONIC BILATERAL LOW BACK PAIN WITH RIGHT-SIDED SCIATICA: Primary | ICD-10-CM

## 2021-12-09 DIAGNOSIS — M54.16 RIGHT LUMBAR RADICULITIS: ICD-10-CM

## 2021-12-09 DIAGNOSIS — M54.41 CHRONIC BILATERAL LOW BACK PAIN WITH RIGHT-SIDED SCIATICA: Primary | ICD-10-CM

## 2021-12-09 PROCEDURE — 97140 MANUAL THERAPY 1/> REGIONS: CPT | Mod: GP | Performed by: PHYSICAL THERAPY ASSISTANT

## 2021-12-09 PROCEDURE — 97110 THERAPEUTIC EXERCISES: CPT | Mod: GP | Performed by: PHYSICAL THERAPY ASSISTANT

## 2021-12-21 ENCOUNTER — TRANSFERRED RECORDS (OUTPATIENT)
Dept: HEALTH INFORMATION MANAGEMENT | Facility: CLINIC | Age: 31
End: 2021-12-21
Payer: COMMERCIAL

## 2022-01-18 VITALS
SYSTOLIC BLOOD PRESSURE: 126 MMHG | HEIGHT: 60 IN | HEART RATE: 72 BPM | WEIGHT: 138 LBS | BODY MASS INDEX: 27.09 KG/M2 | DIASTOLIC BLOOD PRESSURE: 88 MMHG | OXYGEN SATURATION: 98 %

## 2022-01-18 VITALS
WEIGHT: 142 LBS | HEIGHT: 60 IN | SYSTOLIC BLOOD PRESSURE: 132 MMHG | DIASTOLIC BLOOD PRESSURE: 94 MMHG | HEART RATE: 84 BPM | BODY MASS INDEX: 27.88 KG/M2

## 2022-01-19 ENCOUNTER — TRANSCRIBE ORDERS (OUTPATIENT)
Dept: OTHER | Age: 32
End: 2022-01-19
Payer: COMMERCIAL

## 2022-01-19 DIAGNOSIS — R10.2 PELVIC PRESSURE IN FEMALE: ICD-10-CM

## 2022-01-19 DIAGNOSIS — K59.02 CONSTIPATION BY OUTLET OBSTRUCTION: Primary | ICD-10-CM
